# Patient Record
Sex: FEMALE | Race: WHITE | Employment: FULL TIME | URBAN - METROPOLITAN AREA
[De-identification: names, ages, dates, MRNs, and addresses within clinical notes are randomized per-mention and may not be internally consistent; named-entity substitution may affect disease eponyms.]

---

## 2017-03-08 DIAGNOSIS — F17.219 CIGARETTE NICOTINE DEPENDENCE WITH NICOTINE-INDUCED DISORDER: Primary | ICD-10-CM

## 2017-03-08 RX ORDER — NICOTINE 21 MG/24HR
1 PATCH, TRANSDERMAL 24 HOURS TRANSDERMAL EVERY 24 HOURS
Qty: 30 PATCH | Refills: 0 | Status: SHIPPED | OUTPATIENT
Start: 2017-03-08 | End: 2021-06-10

## 2017-03-08 RX ORDER — NICOTINE 21 MG/24HR
1 PATCH, TRANSDERMAL 24 HOURS TRANSDERMAL EVERY 24 HOURS
Qty: 30 PATCH | Refills: 0 | Status: SHIPPED | OUTPATIENT
Start: 2017-03-08

## 2017-03-08 NOTE — TELEPHONE ENCOUNTER
Pending Prescriptions:                       Disp   Refills    nicotine (NICODERM CQ) 21 MG/24HR 24 hr p*28 pat*3            Sig: Place 1 patch onto the skin every 24 hours          Last Written Prescription Date:    Last Fill Quantity: ,   # refills:   Last Office Visit with [facility], [facility] or [facility] prescribing provider: 12/01/16  Future Office visit:       Routing refill request to provider for review/approval because:  Drug not active on patient's medication list

## 2017-04-24 ENCOUNTER — TRANSFERRED RECORDS (OUTPATIENT)
Dept: HEALTH INFORMATION MANAGEMENT | Facility: CLINIC | Age: 67
End: 2017-04-24

## 2017-05-26 ENCOUNTER — HEALTH MAINTENANCE LETTER (OUTPATIENT)
Age: 67
End: 2017-05-26

## 2017-07-06 ENCOUNTER — TELEPHONE (OUTPATIENT)
Dept: FAMILY MEDICINE | Facility: CLINIC | Age: 67
End: 2017-07-06

## 2017-07-06 DIAGNOSIS — E78.5 HYPERLIPIDEMIA LDL GOAL <100: ICD-10-CM

## 2017-07-06 DIAGNOSIS — I10 ESSENTIAL HYPERTENSION, BENIGN: Primary | ICD-10-CM

## 2017-07-06 NOTE — TELEPHONE ENCOUNTER
Reason for Call:  Medication or medication refill:    Do you use a [facility]?  Name of the pharmacy and phone number for the current request:    [id number] - [place], [place] - [address]      Name of the medication requested: The patient called the pharmacy expecting these refiils to be there   [facility] will fax the order  metoprolol-hydrochlorothiazide (DUTOPROL) 25-12.5 MG TB24 per tablet  LOVASTATIN PO    Other request: Pharmacy will fax request   Last filled by Dr. Cornejo    Can we leave a detailed message on this number? No    Phone number patient can be reached at: [phone] [facility]    Best Time: anytime    Call taken on 7/6/2017 at 6:54 PM by Sudha Pandya

## 2017-07-07 RX ORDER — LOVASTATIN 40 MG
80 TABLET ORAL AT BEDTIME
Qty: 180 TABLET | Refills: 1 | Status: SHIPPED | OUTPATIENT
Start: 2017-07-07 | End: 2021-06-10

## 2017-07-07 NOTE — TELEPHONE ENCOUNTER
Pending Prescriptions:                       Disp   Refills    lovastatin (MEVACOR) 40 MG tablet         180 ta*1            Sig: Take 2 tablets (80 mg) by mouth At Bedtime    metoprolol-hydrochlorothiazide (DUTOPROL)*90 tab*1            Sig: Take 1 tablet by mouth daily        Lovastatin      Last Written Prescription Date:  Historical  Last Fill Quantity: -,   # refills: -  Last Office Visit with [facility] or [facility] prescribing provider: 12/1/16 Gena  Future Office visit:       Metoprolol      Last Written Prescription Date:  Historical  Last Fill Quantity: -,   # refills: -  Last Office Visit with [facility] or [facility] prescribing provider: 12/1/16 Gena                                               Routing refill request to provider for review/approval because:  Medication is reported/historical

## 2017-07-24 ENCOUNTER — TELEPHONE (OUTPATIENT)
Dept: FAMILY MEDICINE | Facility: CLINIC | Age: 67
End: 2017-07-24

## 2017-07-24 DIAGNOSIS — I10 ESSENTIAL HYPERTENSION, BENIGN: Primary | ICD-10-CM

## 2017-07-24 NOTE — TELEPHONE ENCOUNTER
I called patient and spoke with her.   She explained that she use to be on just Metoprolol. She states that the recent script for Metoprolol-HCTZ that was sent in 7/7/17 was very expensive and NOT what she was taking before through [facility] (Dr. oCrnejo).   Patient requesting new prescription to be sent into pharmacy    PCP: I called [facility] and they verified that prior to recent prescription, the patient was getting Metoprolol (succinate ER) 25 mg tablet: 1 tablet daily    Please see pended medication and ensure that it is correct and sign if appropriate.     Route back as patient would like a call back regarding this.     Beatriz Sutherland RN

## 2017-07-25 RX ORDER — METOPROLOL SUCCINATE 25 MG/1
25 TABLET, EXTENDED RELEASE ORAL DAILY
Qty: 90 TABLET | Refills: 0 | Status: SHIPPED | OUTPATIENT
Start: 2017-07-25

## 2017-07-25 NOTE — TELEPHONE ENCOUNTER
I only ordered what was noted on her medication list (which was the metoprolol/hydrochlorothiazide) and did not change the medication.  Metoprolol refill approved.  Schedule clinic visit to follow-up on her hypertension.

## 2017-07-25 NOTE — TELEPHONE ENCOUNTER
I called patient and spoke with her. Explained that the Metoprolol 25 mg ER was sent into pharmacy    Told patient to let us know if she has any issues with getting medication at pharmacy    Patient declined scheduling a future appointment with Dr Avery for follow up BP appointment  Patient said that she is getting another call and needs to go.   I asked that patient call clinic back to schedule future appointment.    Beatriz Sutherland RN

## 2017-07-26 ENCOUNTER — TELEPHONE (OUTPATIENT)
Dept: FAMILY MEDICINE | Facility: CLINIC | Age: 67
End: 2017-07-26

## 2017-07-26 NOTE — TELEPHONE ENCOUNTER
Routing to Dr Avery.     Spoke with patient.   Patient states she needs order for Echocardiogram from PCP--to be done at [facility]---for job with [employer].     Fax number listed below.   Patient requesting call back when complete.      Patient has been seen x 1 by Dr Avery---12-1-16.     Thank you,  Rhona BARRIGA RN,BSN

## 2017-07-26 NOTE — TELEPHONE ENCOUNTER
Left in detailed voice message of information below per MD. Told patient to please contact us or to have DOT examiner reach out to .    Korin Whitaker CMA

## 2017-07-26 NOTE — TELEPHONE ENCOUNTER
Please have patient's DOT examiner contact me (or write to me) to discuss the indication for this test.  I cannot order a test without knowing the indication for it.  Stating that it is for DOT is not enough -- I have performed DOT exams and have not routinely requested for an Echocardiogram as part of the DOT physical/evaluation.

## 2017-07-26 NOTE — TELEPHONE ENCOUNTER
Reason for Call: Request for an order or referral:    Order or referral being requested: For a Cardiac Stress Test at [facility]  Fax # [phone]    Date needed: as soon as possible    Has the patient been seen by the PCP for this problem? Not Applicable    Additional comments: The patient needs an order for a Stress test for her Job with the [employer]    Phone number Patient can be reached at:  Home number on file [phone] (home)    Best Time:  Anytime  Please call when Order has been faxed so patient can call and schedule    Can we leave a detailed message on this number?  YES    Call taken on 7/26/2017 at 2:00 PM by Sudha [place]

## 2017-07-28 NOTE — TELEPHONE ENCOUNTER
Left voice message for patient to call clinic back to confirm and relay message below.  Korin Dow- [facility]

## 2017-07-31 NOTE — TELEPHONE ENCOUNTER
Echo required q2 years as patient has stents.     Dr. Avery,   Patient states brought paperwork stating this is required for this patient. Did you find paperwork on your desk? Patient states brought paperwork last Friday for you.

## 2017-08-01 NOTE — TELEPHONE ENCOUNTER
I saw the papers last week and did not encounter anything about the need for echocardiogram because of history of coronary artery stents. Please have her DOT examiner contact me to further discuss this.

## 2017-08-02 NOTE — TELEPHONE ENCOUNTER
Reason for Call:  Returning Call     Detailed comments: Anneliese Oshea is returning phone call     Phone Number Patient can be reached at: Home number on file [phone] (home)    Best Time: ASAP     Can we leave a detailed message on this number? YES    Call taken on 8/2/2017 at 12:31 PM by Kizzy Silva

## 2017-08-02 NOTE — TELEPHONE ENCOUNTER
Tried calling patient back, no answer, and no voicemail picked-up on the phone call. Can try back at later time.  Korin Whitaker CMA

## 2017-08-02 NOTE — TELEPHONE ENCOUNTER
Tried to reach patient at home # given. Phone rang but no V.mail. No other # listed in chart.  Bridget Olea MA

## 2021-06-10 ENCOUNTER — APPOINTMENT (OUTPATIENT)
Dept: GENERAL RADIOLOGY | Facility: CLINIC | Age: 71
DRG: 189 | End: 2021-06-10
Attending: NURSE PRACTITIONER
Payer: MEDICARE

## 2021-06-10 ENCOUNTER — APPOINTMENT (OUTPATIENT)
Dept: GENERAL RADIOLOGY | Facility: CLINIC | Age: 71
DRG: 189 | End: 2021-06-10
Attending: EMERGENCY MEDICINE
Payer: MEDICARE

## 2021-06-10 ENCOUNTER — APPOINTMENT (OUTPATIENT)
Dept: CT IMAGING | Facility: CLINIC | Age: 71
DRG: 189 | End: 2021-06-10
Attending: EMERGENCY MEDICINE
Payer: MEDICARE

## 2021-06-10 ENCOUNTER — HOSPITAL ENCOUNTER (INPATIENT)
Facility: CLINIC | Age: 71
LOS: 3 days | Discharge: HOME | DRG: 189 | End: 2021-06-13
Attending: EMERGENCY MEDICINE | Admitting: INTERNAL MEDICINE
Payer: MEDICARE

## 2021-06-10 DIAGNOSIS — J96.22 ACUTE ON CHRONIC RESPIRATORY FAILURE WITH HYPOXIA AND HYPERCAPNIA (H): ICD-10-CM

## 2021-06-10 DIAGNOSIS — J44.1 COPD EXACERBATION (H): ICD-10-CM

## 2021-06-10 DIAGNOSIS — J96.21 ACUTE ON CHRONIC RESPIRATORY FAILURE WITH HYPOXIA AND HYPERCAPNIA (H): ICD-10-CM

## 2021-06-10 LAB
ALBUMIN SERPL-MCNC: 3.8 G/DL (ref 3.4–5)
ALP SERPL-CCNC: 92 U/L (ref 40–150)
ALT SERPL W P-5'-P-CCNC: 19 U/L (ref 0–50)
ANION GAP SERPL CALCULATED.3IONS-SCNC: 7 MMOL/L (ref 3–14)
AST SERPL W P-5'-P-CCNC: 12 U/L (ref 0–45)
BASE DEFICIT BLDV-SCNC: 0.6 MMOL/L
BASE DEFICIT BLDV-SCNC: 2.1 MMOL/L
BASE EXCESS BLDV CALC-SCNC: 1.3 MMOL/L
BASOPHILS # BLD AUTO: 0.1 10E9/L (ref 0–0.2)
BASOPHILS NFR BLD AUTO: 1 %
BILIRUB SERPL-MCNC: 0.4 MG/DL (ref 0.2–1.3)
BUN SERPL-MCNC: 18 MG/DL (ref 7–30)
CALCIUM SERPL-MCNC: 9.1 MG/DL (ref 8.5–10.1)
CHLORIDE SERPL-SCNC: 105 MMOL/L (ref 94–109)
CO2 SERPL-SCNC: 24 MMOL/L (ref 20–32)
CREAT SERPL-MCNC: 0.86 MG/DL (ref 0.52–1.04)
D DIMER PPP FEU-MCNC: 0.9 UG/ML FEU (ref 0–0.5)
DIFFERENTIAL METHOD BLD: ABNORMAL
EOSINOPHIL # BLD AUTO: 1.1 10E9/L (ref 0–0.7)
EOSINOPHIL NFR BLD AUTO: 9 %
ERYTHROCYTE [DISTWIDTH] IN BLOOD BY AUTOMATED COUNT: 12.6 % (ref 10–15)
GFR SERPL CREATININE-BSD FRML MDRD: 69 ML/MIN/{1.73_M2}
GLUCOSE SERPL-MCNC: 169 MG/DL (ref 70–99)
HCO3 BLDV-SCNC: 25 MMOL/L (ref 21–28)
HCO3 BLDV-SCNC: 26 MMOL/L (ref 21–28)
HCO3 BLDV-SCNC: 28 MMOL/L (ref 21–28)
HCT VFR BLD AUTO: 44.1 % (ref 35–47)
HGB BLD-MCNC: 14.4 G/DL (ref 11.7–15.7)
INR PPP: 0.91 (ref 0.86–1.14)
INTERPRETATION ECG - MUSE: NORMAL
LABORATORY COMMENT REPORT: NORMAL
LACTATE BLD-SCNC: 1.6 MMOL/L (ref 0.7–2)
LYMPHOCYTES # BLD AUTO: 6.2 10E9/L (ref 0.8–5.3)
LYMPHOCYTES NFR BLD AUTO: 50 %
MAGNESIUM SERPL-MCNC: 2.2 MG/DL (ref 1.6–2.3)
MCH RBC QN AUTO: 32.2 PG (ref 26.5–33)
MCHC RBC AUTO-ENTMCNC: 32.7 G/DL (ref 31.5–36.5)
MCV RBC AUTO: 99 FL (ref 78–100)
MONOCYTES # BLD AUTO: 0.8 10E9/L (ref 0–1.3)
MONOCYTES NFR BLD AUTO: 6 %
NEUTROPHILS # BLD AUTO: 4.3 10E9/L (ref 1.6–8.3)
NEUTROPHILS NFR BLD AUTO: 34 %
NT-PROBNP SERPL-MCNC: 213 PG/ML (ref 0–900)
O2/TOTAL GAS SETTING VFR VENT: ABNORMAL %
O2/TOTAL GAS SETTING VFR VENT: ABNORMAL %
O2/TOTAL GAS SETTING VFR VENT: NORMAL %
OXYHGB MFR BLDV: 49 %
OXYHGB MFR BLDV: 82 %
PCO2 BLDV: 46 MM HG (ref 40–50)
PCO2 BLDV: 49 MM HG (ref 40–50)
PCO2 BLDV: 57 MM HG (ref 40–50)
PH BLDV: 7.27 PH (ref 7.32–7.43)
PH BLDV: 7.35 PH (ref 7.32–7.43)
PH BLDV: 7.36 PH (ref 7.32–7.43)
PLATELET # BLD AUTO: 298 10E9/L (ref 150–450)
PLATELET # BLD EST: ABNORMAL 10*3/UL
PO2 BLDV: 28 MM HG (ref 25–47)
PO2 BLDV: 50 MM HG (ref 25–47)
PO2 BLDV: 74 MM HG (ref 25–47)
POTASSIUM SERPL-SCNC: 3.7 MMOL/L (ref 3.4–5.3)
PROT SERPL-MCNC: 7.5 G/DL (ref 6.8–8.8)
RBC # BLD AUTO: 4.47 10E12/L (ref 3.8–5.2)
RBC MORPH BLD: NORMAL
SARS-COV-2 RNA RESP QL NAA+PROBE: NEGATIVE
SODIUM SERPL-SCNC: 136 MMOL/L (ref 133–144)
SPECIMEN SOURCE: NORMAL
TROPONIN I SERPL-MCNC: <0.015 UG/L (ref 0–0.04)
WBC # BLD AUTO: 12.5 10E9/L (ref 4–11)

## 2021-06-10 PROCEDURE — 250N000009 HC RX 250: Performed by: EMERGENCY MEDICINE

## 2021-06-10 PROCEDURE — 250N000011 HC RX IP 250 OP 636: Performed by: INTERNAL MEDICINE

## 2021-06-10 PROCEDURE — 82805 BLOOD GASES W/O2 SATURATION: CPT | Performed by: NURSE PRACTITIONER

## 2021-06-10 PROCEDURE — 83880 ASSAY OF NATRIURETIC PEPTIDE: CPT | Performed by: EMERGENCY MEDICINE

## 2021-06-10 PROCEDURE — 36415 COLL VENOUS BLD VENIPUNCTURE: CPT | Performed by: NURSE PRACTITIONER

## 2021-06-10 PROCEDURE — 94640 AIRWAY INHALATION TREATMENT: CPT

## 2021-06-10 PROCEDURE — 258N000003 HC RX IP 258 OP 636: Performed by: EMERGENCY MEDICINE

## 2021-06-10 PROCEDURE — 84484 ASSAY OF TROPONIN QUANT: CPT | Performed by: EMERGENCY MEDICINE

## 2021-06-10 PROCEDURE — 85379 FIBRIN DEGRADATION QUANT: CPT | Performed by: EMERGENCY MEDICINE

## 2021-06-10 PROCEDURE — 250N000009 HC RX 250: Performed by: INTERNAL MEDICINE

## 2021-06-10 PROCEDURE — 250N000011 HC RX IP 250 OP 636

## 2021-06-10 PROCEDURE — 83735 ASSAY OF MAGNESIUM: CPT | Performed by: EMERGENCY MEDICINE

## 2021-06-10 PROCEDURE — 250N000009 HC RX 250

## 2021-06-10 PROCEDURE — 93005 ELECTROCARDIOGRAM TRACING: CPT

## 2021-06-10 PROCEDURE — 71045 X-RAY EXAM CHEST 1 VIEW: CPT

## 2021-06-10 PROCEDURE — 71275 CT ANGIOGRAPHY CHEST: CPT

## 2021-06-10 PROCEDURE — 120N000001 HC R&B MED SURG/OB

## 2021-06-10 PROCEDURE — 99223 1ST HOSP IP/OBS HIGH 75: CPT | Mod: AI | Performed by: INTERNAL MEDICINE

## 2021-06-10 PROCEDURE — 250N000013 HC RX MED GY IP 250 OP 250 PS 637: Performed by: INTERNAL MEDICINE

## 2021-06-10 PROCEDURE — 80053 COMPREHEN METABOLIC PANEL: CPT | Performed by: EMERGENCY MEDICINE

## 2021-06-10 PROCEDURE — 258N000003 HC RX IP 258 OP 636: Performed by: INTERNAL MEDICINE

## 2021-06-10 PROCEDURE — 85610 PROTHROMBIN TIME: CPT | Performed by: EMERGENCY MEDICINE

## 2021-06-10 PROCEDURE — 94660 CPAP INITIATION&MGMT: CPT

## 2021-06-10 PROCEDURE — 94640 AIRWAY INHALATION TREATMENT: CPT | Mod: 76

## 2021-06-10 PROCEDURE — 87635 SARS-COV-2 COVID-19 AMP PRB: CPT | Performed by: EMERGENCY MEDICINE

## 2021-06-10 PROCEDURE — 96365 THER/PROPH/DIAG IV INF INIT: CPT | Mod: 59

## 2021-06-10 PROCEDURE — 99291 CRITICAL CARE FIRST HOUR: CPT | Performed by: NURSE PRACTITIONER

## 2021-06-10 PROCEDURE — 250N000011 HC RX IP 250 OP 636: Performed by: EMERGENCY MEDICINE

## 2021-06-10 PROCEDURE — 82805 BLOOD GASES W/O2 SATURATION: CPT | Performed by: EMERGENCY MEDICINE

## 2021-06-10 PROCEDURE — 999N000157 HC STATISTIC RCP TIME EA 10 MIN

## 2021-06-10 PROCEDURE — 85025 COMPLETE CBC W/AUTO DIFF WBC: CPT | Performed by: EMERGENCY MEDICINE

## 2021-06-10 PROCEDURE — 99285 EMERGENCY DEPT VISIT HI MDM: CPT | Mod: 25

## 2021-06-10 PROCEDURE — 99207 PR APP CREDIT; MD BILLING SHARED VISIT: CPT | Performed by: INTERNAL MEDICINE

## 2021-06-10 PROCEDURE — 71045 X-RAY EXAM CHEST 1 VIEW: CPT | Mod: 77

## 2021-06-10 PROCEDURE — C9803 HOPD COVID-19 SPEC COLLECT: HCPCS

## 2021-06-10 PROCEDURE — 5A09357 ASSISTANCE WITH RESPIRATORY VENTILATION, LESS THAN 24 CONSECUTIVE HOURS, CONTINUOUS POSITIVE AIRWAY PRESSURE: ICD-10-PCS | Performed by: INTERNAL MEDICINE

## 2021-06-10 PROCEDURE — 83605 ASSAY OF LACTIC ACID: CPT | Performed by: EMERGENCY MEDICINE

## 2021-06-10 PROCEDURE — 96375 TX/PRO/DX INJ NEW DRUG ADDON: CPT

## 2021-06-10 PROCEDURE — 82803 BLOOD GASES ANY COMBINATION: CPT | Performed by: EMERGENCY MEDICINE

## 2021-06-10 RX ORDER — NALOXONE HYDROCHLORIDE 0.4 MG/ML
0.2 INJECTION, SOLUTION INTRAMUSCULAR; INTRAVENOUS; SUBCUTANEOUS
Status: DISCONTINUED | OUTPATIENT
Start: 2021-06-10 | End: 2021-06-13 | Stop reason: HOSPADM

## 2021-06-10 RX ORDER — LORAZEPAM 2 MG/ML
0.5 INJECTION INTRAMUSCULAR ONCE
Status: COMPLETED | OUTPATIENT
Start: 2021-06-10 | End: 2021-06-10

## 2021-06-10 RX ORDER — ALBUTEROL SULFATE 5 MG/ML
10 SOLUTION, NON-ORAL INHALATION CONTINUOUS
Status: ACTIVE | OUTPATIENT
Start: 2021-06-10 | End: 2021-06-10

## 2021-06-10 RX ORDER — IOPAMIDOL 755 MG/ML
57 INJECTION, SOLUTION INTRAVASCULAR ONCE
Status: COMPLETED | OUTPATIENT
Start: 2021-06-10 | End: 2021-06-10

## 2021-06-10 RX ORDER — ALBUTEROL SULFATE 0.83 MG/ML
3 SOLUTION RESPIRATORY (INHALATION)
Status: DISCONTINUED | OUTPATIENT
Start: 2021-06-10 | End: 2021-06-13 | Stop reason: HOSPADM

## 2021-06-10 RX ORDER — IPRATROPIUM BROMIDE AND ALBUTEROL SULFATE 2.5; .5 MG/3ML; MG/3ML
3 SOLUTION RESPIRATORY (INHALATION)
Status: DISCONTINUED | OUTPATIENT
Start: 2021-06-10 | End: 2021-06-13 | Stop reason: HOSPADM

## 2021-06-10 RX ORDER — TRAMADOL HYDROCHLORIDE 50 MG/1
25 TABLET ORAL EVERY 6 HOURS PRN
COMMUNITY

## 2021-06-10 RX ORDER — NALOXONE HYDROCHLORIDE 0.4 MG/ML
0.4 INJECTION, SOLUTION INTRAMUSCULAR; INTRAVENOUS; SUBCUTANEOUS
Status: DISCONTINUED | OUTPATIENT
Start: 2021-06-10 | End: 2021-06-13 | Stop reason: HOSPADM

## 2021-06-10 RX ORDER — SODIUM CHLORIDE, SODIUM LACTATE, POTASSIUM CHLORIDE, CALCIUM CHLORIDE 600; 310; 30; 20 MG/100ML; MG/100ML; MG/100ML; MG/100ML
INJECTION, SOLUTION INTRAVENOUS CONTINUOUS
Status: DISCONTINUED | OUTPATIENT
Start: 2021-06-10 | End: 2021-06-12

## 2021-06-10 RX ORDER — CEFTRIAXONE 2 G/1
2 INJECTION, POWDER, FOR SOLUTION INTRAMUSCULAR; INTRAVENOUS ONCE
Status: COMPLETED | OUTPATIENT
Start: 2021-06-10 | End: 2021-06-10

## 2021-06-10 RX ORDER — METHYLPREDNISOLONE SODIUM SUCCINATE 125 MG/2ML
125 INJECTION, POWDER, LYOPHILIZED, FOR SOLUTION INTRAMUSCULAR; INTRAVENOUS ONCE
Status: COMPLETED | OUTPATIENT
Start: 2021-06-10 | End: 2021-06-10

## 2021-06-10 RX ORDER — MAGNESIUM SULFATE HEPTAHYDRATE 40 MG/ML
2 INJECTION, SOLUTION INTRAVENOUS ONCE
Status: COMPLETED | OUTPATIENT
Start: 2021-06-10 | End: 2021-06-10

## 2021-06-10 RX ORDER — AMOXICILLIN 250 MG
1 CAPSULE ORAL 2 TIMES DAILY PRN
Status: DISCONTINUED | OUTPATIENT
Start: 2021-06-10 | End: 2021-06-13 | Stop reason: HOSPADM

## 2021-06-10 RX ORDER — CARBOXYMETHYLCELLULOSE SODIUM 5 MG/ML
1 SOLUTION/ DROPS OPHTHALMIC
Status: DISCONTINUED | OUTPATIENT
Start: 2021-06-10 | End: 2021-06-13 | Stop reason: HOSPADM

## 2021-06-10 RX ORDER — AMOXICILLIN 250 MG
2 CAPSULE ORAL 2 TIMES DAILY PRN
Status: DISCONTINUED | OUTPATIENT
Start: 2021-06-10 | End: 2021-06-13 | Stop reason: HOSPADM

## 2021-06-10 RX ORDER — AZITHROMYCIN 500 MG/1
500 INJECTION, POWDER, LYOPHILIZED, FOR SOLUTION INTRAVENOUS ONCE
Status: COMPLETED | OUTPATIENT
Start: 2021-06-10 | End: 2021-06-10

## 2021-06-10 RX ORDER — LORAZEPAM 2 MG/ML
INJECTION INTRAMUSCULAR
Status: COMPLETED
Start: 2021-06-10 | End: 2021-06-10

## 2021-06-10 RX ORDER — GUAIFENESIN/DEXTROMETHORPHAN 100-10MG/5
10 SYRUP ORAL EVERY 4 HOURS PRN
Status: DISCONTINUED | OUTPATIENT
Start: 2021-06-10 | End: 2021-06-13 | Stop reason: HOSPADM

## 2021-06-10 RX ORDER — ACETAMINOPHEN 650 MG/1
650 SUPPOSITORY RECTAL EVERY 4 HOURS PRN
Status: DISCONTINUED | OUTPATIENT
Start: 2021-06-10 | End: 2021-06-13 | Stop reason: HOSPADM

## 2021-06-10 RX ORDER — ONDANSETRON 2 MG/ML
4 INJECTION INTRAMUSCULAR; INTRAVENOUS EVERY 6 HOURS PRN
Status: DISCONTINUED | OUTPATIENT
Start: 2021-06-10 | End: 2021-06-13 | Stop reason: HOSPADM

## 2021-06-10 RX ORDER — ONDANSETRON 4 MG/1
4 TABLET, ORALLY DISINTEGRATING ORAL EVERY 6 HOURS PRN
Status: DISCONTINUED | OUTPATIENT
Start: 2021-06-10 | End: 2021-06-13 | Stop reason: HOSPADM

## 2021-06-10 RX ORDER — ACETAMINOPHEN 325 MG/1
650 TABLET ORAL EVERY 4 HOURS PRN
Status: DISCONTINUED | OUTPATIENT
Start: 2021-06-10 | End: 2021-06-13 | Stop reason: HOSPADM

## 2021-06-10 RX ORDER — IPRATROPIUM BROMIDE AND ALBUTEROL SULFATE 2.5; .5 MG/3ML; MG/3ML
3 SOLUTION RESPIRATORY (INHALATION)
Status: COMPLETED | OUTPATIENT
Start: 2021-06-10 | End: 2021-06-10

## 2021-06-10 RX ORDER — LIDOCAINE 40 MG/G
CREAM TOPICAL
Status: DISCONTINUED | OUTPATIENT
Start: 2021-06-10 | End: 2021-06-13 | Stop reason: HOSPADM

## 2021-06-10 RX ORDER — CYCLOBENZAPRINE HCL 5 MG
5 TABLET ORAL 3 TIMES DAILY PRN
COMMUNITY

## 2021-06-10 RX ORDER — METHYLPREDNISOLONE SODIUM SUCCINATE 125 MG/2ML
60 INJECTION, POWDER, LYOPHILIZED, FOR SOLUTION INTRAMUSCULAR; INTRAVENOUS EVERY 12 HOURS
Status: DISCONTINUED | OUTPATIENT
Start: 2021-06-10 | End: 2021-06-10

## 2021-06-10 RX ORDER — IPRATROPIUM BROMIDE AND ALBUTEROL SULFATE 2.5; .5 MG/3ML; MG/3ML
3 SOLUTION RESPIRATORY (INHALATION) ONCE
Status: COMPLETED | OUTPATIENT
Start: 2021-06-10 | End: 2021-06-10

## 2021-06-10 RX ORDER — METHYLPREDNISOLONE SODIUM SUCCINATE 125 MG/2ML
60 INJECTION, POWDER, LYOPHILIZED, FOR SOLUTION INTRAMUSCULAR; INTRAVENOUS EVERY 8 HOURS
Status: DISCONTINUED | OUTPATIENT
Start: 2021-06-10 | End: 2021-06-12

## 2021-06-10 RX ORDER — BISACODYL 10 MG
10 SUPPOSITORY, RECTAL RECTAL DAILY PRN
Status: DISCONTINUED | OUTPATIENT
Start: 2021-06-10 | End: 2021-06-13 | Stop reason: HOSPADM

## 2021-06-10 RX ORDER — PROCHLORPERAZINE MALEATE 5 MG
5 TABLET ORAL EVERY 6 HOURS PRN
Status: DISCONTINUED | OUTPATIENT
Start: 2021-06-10 | End: 2021-06-13 | Stop reason: HOSPADM

## 2021-06-10 RX ORDER — PANTOPRAZOLE SODIUM 40 MG/1
40 TABLET, DELAYED RELEASE ORAL
Status: DISCONTINUED | OUTPATIENT
Start: 2021-06-10 | End: 2021-06-13 | Stop reason: HOSPADM

## 2021-06-10 RX ORDER — PROCHLORPERAZINE 25 MG
12.5 SUPPOSITORY, RECTAL RECTAL EVERY 12 HOURS PRN
Status: DISCONTINUED | OUTPATIENT
Start: 2021-06-10 | End: 2021-06-13 | Stop reason: HOSPADM

## 2021-06-10 RX ORDER — IPRATROPIUM BROMIDE AND ALBUTEROL SULFATE 2.5; .5 MG/3ML; MG/3ML
SOLUTION RESPIRATORY (INHALATION)
Status: COMPLETED
Start: 2021-06-10 | End: 2021-06-10

## 2021-06-10 RX ORDER — NITROGLYCERIN 0.4 MG/1
0.4 TABLET SUBLINGUAL EVERY 5 MIN PRN
Status: DISCONTINUED | OUTPATIENT
Start: 2021-06-10 | End: 2021-06-13 | Stop reason: HOSPADM

## 2021-06-10 RX ORDER — TRAMADOL HYDROCHLORIDE 50 MG/1
50 TABLET ORAL EVERY 6 HOURS PRN
Status: DISCONTINUED | OUTPATIENT
Start: 2021-06-10 | End: 2021-06-13 | Stop reason: HOSPADM

## 2021-06-10 RX ORDER — LIDOCAINE 40 MG/G
CREAM TOPICAL
Status: DISCONTINUED | OUTPATIENT
Start: 2021-06-10 | End: 2021-06-13

## 2021-06-10 RX ORDER — CYCLOBENZAPRINE HCL 5 MG
5 TABLET ORAL 3 TIMES DAILY PRN
Status: DISCONTINUED | OUTPATIENT
Start: 2021-06-10 | End: 2021-06-13 | Stop reason: HOSPADM

## 2021-06-10 RX ORDER — AZITHROMYCIN 250 MG/1
250 TABLET, FILM COATED ORAL DAILY
Status: DISCONTINUED | OUTPATIENT
Start: 2021-06-11 | End: 2021-06-13 | Stop reason: HOSPADM

## 2021-06-10 RX ADMIN — ACETAMINOPHEN 650 MG: 325 TABLET, FILM COATED ORAL at 22:05

## 2021-06-10 RX ADMIN — GUAIFENESIN AND DEXTROMETHORPHAN 10 ML: 100; 10 SYRUP ORAL at 11:09

## 2021-06-10 RX ADMIN — SODIUM CHLORIDE, POTASSIUM CHLORIDE, SODIUM LACTATE AND CALCIUM CHLORIDE: 600; 310; 30; 20 INJECTION, SOLUTION INTRAVENOUS at 20:04

## 2021-06-10 RX ADMIN — IPRATROPIUM BROMIDE AND ALBUTEROL SULFATE 3 ML: .5; 3 SOLUTION RESPIRATORY (INHALATION) at 03:11

## 2021-06-10 RX ADMIN — AZITHROMYCIN MONOHYDRATE 500 MG: 500 INJECTION, POWDER, LYOPHILIZED, FOR SOLUTION INTRAVENOUS at 06:36

## 2021-06-10 RX ADMIN — LORAZEPAM 2 MG: 2 INJECTION INTRAMUSCULAR; INTRAVENOUS at 02:49

## 2021-06-10 RX ADMIN — CYCLOBENZAPRINE HYDROCHLORIDE 5 MG: 5 TABLET, FILM COATED ORAL at 22:54

## 2021-06-10 RX ADMIN — TRAMADOL HYDROCHLORIDE 50 MG: 50 TABLET, FILM COATED ORAL at 17:28

## 2021-06-10 RX ADMIN — IPRATROPIUM BROMIDE AND ALBUTEROL SULFATE: .5; 3 SOLUTION RESPIRATORY (INHALATION) at 02:50

## 2021-06-10 RX ADMIN — IPRATROPIUM BROMIDE AND ALBUTEROL SULFATE 3 ML: .5; 3 SOLUTION RESPIRATORY (INHALATION) at 03:12

## 2021-06-10 RX ADMIN — CEFTRIAXONE SODIUM 2 G: 2 INJECTION, POWDER, FOR SOLUTION INTRAMUSCULAR; INTRAVENOUS at 06:00

## 2021-06-10 RX ADMIN — SODIUM CHLORIDE, POTASSIUM CHLORIDE, SODIUM LACTATE AND CALCIUM CHLORIDE 500 ML: 600; 310; 30; 20 INJECTION, SOLUTION INTRAVENOUS at 02:57

## 2021-06-10 RX ADMIN — METHYLPREDNISOLONE SODIUM SUCCINATE 62.5 MG: 125 INJECTION, POWDER, FOR SOLUTION INTRAMUSCULAR; INTRAVENOUS at 11:09

## 2021-06-10 RX ADMIN — LORAZEPAM 2 MG: 2 INJECTION INTRAMUSCULAR at 02:49

## 2021-06-10 RX ADMIN — IPRATROPIUM BROMIDE AND ALBUTEROL SULFATE 3 ML: .5; 3 SOLUTION RESPIRATORY (INHALATION) at 21:52

## 2021-06-10 RX ADMIN — ACETAMINOPHEN 650 MG: 325 TABLET, FILM COATED ORAL at 10:23

## 2021-06-10 RX ADMIN — METHYLPREDNISOLONE SODIUM SUCCINATE 62.5 MG: 125 INJECTION, POWDER, FOR SOLUTION INTRAMUSCULAR; INTRAVENOUS at 20:04

## 2021-06-10 RX ADMIN — IOPAMIDOL 57 ML: 755 INJECTION, SOLUTION INTRAVENOUS at 05:03

## 2021-06-10 RX ADMIN — IPRATROPIUM BROMIDE AND ALBUTEROL SULFATE 3 ML: .5; 3 SOLUTION RESPIRATORY (INHALATION) at 09:12

## 2021-06-10 RX ADMIN — SODIUM CHLORIDE, POTASSIUM CHLORIDE, SODIUM LACTATE AND CALCIUM CHLORIDE: 600; 310; 30; 20 INJECTION, SOLUTION INTRAVENOUS at 08:00

## 2021-06-10 RX ADMIN — IPRATROPIUM BROMIDE AND ALBUTEROL SULFATE 3 ML: .5; 3 SOLUTION RESPIRATORY (INHALATION) at 15:36

## 2021-06-10 RX ADMIN — MAGNESIUM SULFATE HEPTAHYDRATE 2 G: 40 INJECTION, SOLUTION INTRAVENOUS at 02:50

## 2021-06-10 RX ADMIN — METHYLPREDNISOLONE SODIUM SUCCINATE 125 MG: 125 INJECTION, POWDER, FOR SOLUTION INTRAMUSCULAR; INTRAVENOUS at 02:49

## 2021-06-10 RX ADMIN — SODIUM CHLORIDE, POTASSIUM CHLORIDE, SODIUM LACTATE AND CALCIUM CHLORIDE: 600; 310; 30; 20 INJECTION, SOLUTION INTRAVENOUS at 03:35

## 2021-06-10 ASSESSMENT — ACTIVITIES OF DAILY LIVING (ADL)
ADLS_ACUITY_SCORE: 19

## 2021-06-10 NOTE — ED PROVIDER NOTES
History     Chief Complaint:  Shortness of Breath    The history is provided by the patient and the EMS personnel. The history is limited by the condition of the patient.      Anneliese Oshea is a 71 year old female with a history of COPD, CAD, hyperlipidemia, and hypertension who presents with shortness of breath. Per EMS report, the patient started to feel short of breath yesterday that has been progressively worsening, prompting her 911 call. When they arrived, she was diaphoretic and not experiencing very much air movement which made them place her on CPAP and her O2 returned to 99%. The patient had a blood pressure of 180 systolic. Here, the patient communicated she has had an increasing dry cough with her shortness of breath but denies fevers, leg swelling or any other swelling, or chest pain.     Review of Systems   Unable to perform ROS: Severe respiratory distress       Allergies:  Latex    Medications:    Albuterol  Aspirin 81 mg  Lovastatin  Metoprolol Succinate  Nicoderm CQ    Past Medical History:    CAD  Hemorrhoids  Hypertension  Hyperlipidemia  Reactive airway disease  Prediabetes  COPD  Coronary atherosclerosis  GERD  Smoking    Past Surgical History:    Hysterectomy  Carpal tunnel release  Coronary stent placement    Family History:    Father: lung cancer  Brother: bipolar disorder  Mother: post fractured hip  Sister: morbid obesity    Social History:  Presents to ED via EMS alone.     Physical Exam     Patient Vitals for the past 24 hrs:   BP Temp Temp src Pulse Resp SpO2   06/10/21 0600 136/87 -- -- 80 17 99 %   06/10/21 0530 (!) 163/107 -- -- 91 -- --   06/10/21 0445 -- -- -- 77 16 100 %   06/10/21 0430 122/81 -- -- 74 19 100 %   06/10/21 0415 138/86 -- -- 79 22 100 %   06/10/21 0400 (!) 140/94 -- -- 81 28 100 %   06/10/21 0354 -- -- -- 84 25 96 %   06/10/21 0353 -- -- -- 77 21 99 %   06/10/21 0352 -- -- -- 85 10 100 %   06/10/21 0345 131/82 -- -- 75 20 99 %   06/10/21 0330 (!) 132/93 -- -- 79  25 100 %   06/10/21 0320 (!) 163/102 -- -- 85 24 99 %   06/10/21 0305 -- 96.2  F (35.7  C) Temporal -- -- --   06/10/21 0300 (!) 162/110 -- -- 93 (!) 33 100 %   06/10/21 0245 (!) 150/128 -- -- 98 24 100 %       Physical Exam  General: Appears ill, severe respiratory distress, unable to speak more than 1 word at at time.   Head:  Scalp, face, and head appear normal  Eyes:  Pupils are equal, round, reactive to light     Conjunctivae non-injected and sclerae white  ENT:    The external nose is normal    Pinnae are normal  Neck:  Normal range of motion    There is no rigidity noted    Trachea is in the midline  CV:  Regular rate and rhythm     Normal S1/S2, no S3/S4    No murmur or rub. Radial pulses 2+ bilaterally.  Resp:  Poor air movement throughout. Diffuse expiratory wheezing. + tachypnea with significant increased work of breathing and accessory muscle use. Unable to speak in full sentences.  GI:  Abdomen is soft, no rigidity or guarding    No distension, or mass    No tenderness or rebound tenderness   MS:  Normal muscular tone    Symmetric motor strength    No lower extremity edema. No calf swelling or tenderness.  Skin:  No rash or acute skin lesions noted  Neuro: Awake and alert  No facial droop.   Moves all extremities spontaneously  Psych:  Normal affect. Appropriate interactions.      Emergency Department Course   ECG:  ECG taken at 245, ECG read at 258  Normal sinus rhythm with sinus arrhythmia  Nonspecific ST and T wave abnormality  Abnormal ECG   Artifact limits interpretation  Rate 98 bpm. OH interval 176 ms. QRS duration 74 ms. QT/QTc 380/485 ms. P-R-T axes 79 72 112.     Imaging:    CT chest pulmonary embolism w contrast:  IMPRESSION:   1.  There is no pulmonary embolus.   2.  Borderline aneurysmal ascending thoracic aorta measuring 4.0 cm. Evaluation for aortic dissection is limited by suboptimal opacification of the aorta.   3.  Coronary artery atherosclerotic calcifications.       Reading per  radiology.    XR Chest port 1 view:  IMPRESSION: Cardiomediastinal silhouette within normal limits. Calcified granuloma left lung base. No vascular congestion or pleural effusion. Slight interstitial prominence upper lungs. Coronary artery stent. Atherosclerotic aorta.  Reading per radiology.     Laboratory:    Asymptomatic COVID-19 Virus (Coronavirus) by PCR Nasopharyngeal swab: Negative     CBC: WBC 12.5 (H), HGB 14.4,   CMP: glucose: 169 (H) o/w WNL (Creatinine 0.86)     Lactic acid: 1.6     Magnesium: 2.2    D dimer: 0.9 (H)    INR: 0.91    Troponin I: <0.015    Nt probnp impatient: 213    Blood Gas (Collected 0253): pH 7.27 (L), PCO2 57 (H), PO2 74 (H), Bicarbonate 26, base deficit 2.1, FIO2 50%     Emergency Department Course:    0241 Patient arrived via EMS to stabilization room. I received report from EMS and performed exam on patient as above.     0243 CPAP paused.     0248 Med given: Ativan 0.5 mg.    0249 Med given: Solu-medrol.    0350 Patient rechecked and updated.      0524 I spoke with Dr. Crain of the hospitalist service from [facility] regarding patient's presentation, findings, and plan of care.     Interventions:  0249 Ativan 2 mg IV   0249 Solu-medrol 125 mg IV   0250 Magnesium sulfate 2 g IV   0257  mL IV   0311 Duoneb 3 mL neb  0312 Duoneb 3 mL neb  0335 LR IV       Disposition:  The patient was admitted to the hospital under the care of Dr. Crain.    Impression & Plan      Medical Decision Making:  Anneliese Oshea is a 71 year old female who presents for evaluation of shortness of breath and wheezing.  Signs and symptoms are consistent with severe COPD exacerbation.  On arrival patient was on CPAP started by EMS.  She was exhibiting severe increased work of breathing, respiratory distress and hypercapnic respiratory failure therefore she was transitioned to BiPAP here in the emergency department.  A broad differential was considered including COPD, asthma, reactive airway  disease, CHF, pneumothorax, pleural effusions, cardiac equivalent/ACS, viral induced wheezing, allergic phenomena, pneumonia, etc. patient was treated with steroids, continuous DuoNeb's, magnesium sulfate and LR fluid bolus with significant improvement in her respiratory status.  She was covered.  Clear for pneumonia/bacterial bronchitis due to the degree of her respiratory distress with ceftriaxone and azithromycin.  EKG reveals normal sinus rhythm without evidence of ischemia or dysrhythmia.  Troponin negative.  Lactic acid within normal limits.  BNP normal.  Initial VBG with hypercapnic respiratory failure and respiratory acidosis.  This improved on repeat.  The remainder of ED work-up is otherwise reassuring.  No evidence of pneumonia.  D-dimer was elevated therefore CT of the chest was obtained.  This was negative for PE.  No other acute thoracic findings were seen on CT.  COVID-19 testing was negative.    There are no signs at this point of any other serious etiologies including those mentioned above especially acute coronary syndrome. I doubt this is ACS given the classic story of COPD exacerbation given by the patient, the marked wheezing without rales and a nonspecific EKG.  No signs of pneumonia.      Given the degree of her respiratory failure and severe symptoms patient require admission to the hospital for ongoing monitoring evaluation and treatment.  The case was discussed with the hospitalist the patient was admitted to [facility] in improved condition.  She was able to be taken off of BiPAP prior to admission.    Critical Care Time:   Upon my evaluation, this patient had a critical illness with high probability of imminent or life-threatening deterioration due to acute hypoxic and hypercapnic respiratory failure, which required my direct attention, intervention, and personal management.    I have personally provided 50 minutes of critical care time exclusive of time spent on separately billable procedures.  Time includes review of laboratory data, radiology results, discussion with consultants, reassessment of the patient and monitoring for potential decompensation. Interventions were performed as documented above.     Covid-19  Anneliese Oshea was evaluated during a global COVID-19 pandemic, which necessitated consideration that the patient might be at risk for infection with the SARS-CoV-2 virus that causes COVID-19.   Applicable protocols for evaluation were followed during the patient's care.   COVID-19 was considered as part of the patient's evaluation. The plan for testing is:  a test was obtained during this visit.    Diagnosis:    ICD-10-CM    1. COPD exacerbation (H)  J44.1 Blood gas venous and oxyhgb   2. Acute on chronic respiratory failure with hypoxia and hypercapnia (H)  J96.21     J96.22      Scribe Disclosure:  Joleen ENGLE and Orla Severson, am serving as a scribe at 2:43 AM on 6/10/2021 to document services personally performed by Enmanuel Hinojosa MD based on my observations and the provider's statements to me.      Enmanuel Hinojosa MD  06/10/21 0700

## 2021-06-10 NOTE — PLAN OF CARE
IM[facility]: VSS on Bipap @ 70%. Initially using 6-10 L oxymask but d/t coughing spells, pt becomes very SOB and unable to catch breath. RRT called early afternoon. PRN robitussin given for dry cough. Tachycardic while coughing but 80-90 HR while resting. Very dyspneic on exertion, therefore using external catheter to void. UOP adequate. Tele SR/ST. Duonebs scheduled Q4. Skin intact. IVF @ 60. Tylenol given for headache. Tramadol given for chronic shoulder pain. Reg diet/NPO on bipap. CTM

## 2021-06-10 NOTE — PHARMACY-ADMISSION MEDICATION HISTORY
Pharmacy Medication History  Admission medication history interview status for the 6/10/2021  admission is complete. See EPIC admission navigator for prior to admission medications     Location of Interview: Patient room  Medication history sources: Patient    Significant changes made to the medication list:  Removed Step 1 nicotine patch, lovastatin  Added aspirin, tramadol, and cyclobenzaprine    In the past week, patient estimated taking medication this percent of the time: greater than 90%    Additional medication history information:   Pt stopped taking aspirin 1 week ago for upcoming surgery in July (was instructed to hold it 3 weeks prior to surgery).  Pt has a nicotine patch on her arm, last placed 6/9 morning    Medication reconciliation completed by provider prior to medication history? No    Time spent in this activity: 10 minutes    Prior to Admission medications    Medication Sig Last Dose Taking? Auth Provider   albuterol (PROAIR HFA/PROVENTIL HFA/VENTOLIN HFA) 108 (90 BASE) MCG/ACT Inhaler Inhale 2 puffs into the lungs 4 times daily  Yes Reported, Patient   cyclobenzaprine (FLEXERIL) 5 MG tablet Take 5 mg by mouth 3 times daily as needed for muscle spasms  Yes Unknown, Entered By History   metoprolol (TOPROL-XL) 25 MG 24 hr tablet Take 1 tablet (25 mg) by mouth daily 6/9/2021 at AM Yes Avila Avery MD   nicotine (NICODERM CQ) 14 MG/24HR 24 hr patch Place 1 patch onto the skin every 24 hours 2nd month 6/9/2021 at AM Yes Avila Avery MD   traMADol (ULTRAM) 50 MG tablet Take 50 mg by mouth every 6 hours as needed for severe pain  Yes Unknown, Entered By History   nicotine (NICODERM CQ) 7 MG/24HR 24 hr patch Place 1 patch onto the skin every 24 hours 3rd month   Avila Avery MD       The information provided in this note is only as accurate as the sources available at the time of update(s)

## 2021-06-10 NOTE — ED NOTES
Bed: ST03  Expected date:   Expected time:   Means of arrival:   Comments:  531 70f COPD respiratory distress on CPAP eta 8

## 2021-06-10 NOTE — PROGRESS NOTES
RECEIVING UNIT ED HANDOFF REVIEW    ED Nurse Handoff Report was reviewed by: Cata Azul RN on Tg 10, 2021 at 8:28 AM

## 2021-06-10 NOTE — ED NOTES
Patient transported to CT on cart with RN. Patient remains on 10 LPM oxymask. Respirations remain regular and unlabored.

## 2021-06-10 NOTE — H&P
Admitted: 06/10/2021    PRIMARY CARE PHYSICIAN:  Avila Avery M.D.    CODE STATUS:  FULL CODE.  Discussed with the patient.    CHIEF COMPLAINT:  Shortness of breath.    HISTORY OF PRESENT ILLNESS:  Ms. Oshea is a 71-year-old female with a past medical history significant for COPD, coronary artery disease, hypertension, who presents to the Emergency Department with the above concern.  History is obtained through discussion with the ED physician as well as the patient.  The patient states that she has been having shortness of breath, dating back for which she thinks could be at least a couple of weeks, but has been worsening over the past couple of days.  She states that anytime it gets hot or humid or rains outside she starts having difficulty breathing.  The humid and hot weather that we have been having recently has been particularly troublesome for her.  She states that she was having such a hard time catching her breath that she had to call EMS.  She denies any fevers or chills.  She does have a cough at times, which she attributes mostly to postnasal drip.  When she does cough there is usually some clearish type sputum.  She does not get infections regularly, but has been told she has chronic bronchitis.  No sick contacts that she is aware of or no traveling.  When EMS arrived, she was in respiratory distress and required a CPAP, which is how she arrived via EMS.    In the ED, she continued to have ongoing respiratory failure and was placed on BiPAP and received 1 hour of continuous DuoNebs as well as magnesium.  She eventually had improvement in her symptoms and was able to be weaned off of the BiPAP to facemask oxygen.  When she got up to go to the bathroom, she had a slight decompensation and had to turn up oxygen, but has not required BiPAP again at this point.  The patient believes she has been taking all of her medications regularly, but tells me that she typically has such shallow breaths that she could  not tolerate anything like Advair or Spiriva, though it is not clear to me exactly if she has tried these medications in the past.  She does use albuterol at home as needed.    In the ED, the patient also underwent CT of the chest with contrast, which was negative for PE, did show a borderline ascending aortic thoracic aneurysm at 4 cm.  BMP and troponin were unremarkable.  She was given DuoNebs and magnesium as above, started on BiPAP as well as given ceftriaxone, azithromycin, and Solu-Medrol.    PAST MEDICAL HISTORY:    1.  Coronary artery disease.  2.  Hypertension.  3.  Dyslipidemia.  4.  COPD.  5.  Hemorrhoids.  6.  Prediabetes.  7.  GERD.    MEDICATIONS:    Medications Prior to Admission   Medication Sig Dispense Refill Last Dose     albuterol (PROAIR HFA/PROVENTIL HFA/VENTOLIN HFA) 108 (90 BASE) MCG/ACT Inhaler Inhale 2 puffs into the lungs 4 times daily        cyclobenzaprine (FLEXERIL) 5 MG tablet Take 5 mg by mouth 3 times daily as needed for muscle spasms        metoprolol (TOPROL-XL) 25 MG 24 hr tablet Take 1 tablet (25 mg) by mouth daily 90 tablet 0 6/9/2021 at AM     nicotine (NICODERM CQ) 14 MG/24HR 24 hr patch Place 1 patch onto the skin every 24 hours 2nd month 30 patch 0 6/9/2021 at AM     traMADol (ULTRAM) 50 MG tablet Take 25 mg by mouth every 6 hours as needed for severe pain             SOCIAL HISTORY:  The patient quit smoking 4 weeks ago.  Prior to that, smoked 1/2 pack to 1 pack of cigarettes per day.  The patient also quit drinking all alcohol in April.    FAMILY HISTORY:  Both of her parents have passed away and she is unsure of any history for them, but notes her sister has asthma.    ALLERGIES:  NO KNOWN DRUG ALLERGIES.    REVIEW OF SYSTEMS:  Complete review of systems reviewed and negative except for the pertinent positives, which is recorded in HPI.    PHYSICAL EXAMINATION:    VITAL SIGNS:  Show blood pressure of 123/88, heart rate 84, respirations 12, satting 99% on facemask oxygen,  temperature 96.2 degrees Fahrenheit.  GENERAL:  The patient is lying in bed and is mildly short of breath at this point with oxygen and minimal speaking.  HEENT:  Pupils equal, round, reactive to light.  Extraocular muscle function intact.  No scleral icterus.  Oropharynx is clear.  NECK:  No lymphadenopathy or thyromegaly.  CARDIOVASCULAR:  Regular rate and rhythm without any murmur, rub or gallop.  LUNGS:  Air.  PULMONARY:  She has somewhat diminished breath sounds and has wheezing throughout, mostly expiratory.  GASTROINTESTINAL:  Positive bowel sounds, Soft, nontender and nondistended.  SKIN:  No rashes or lesions.  LYMPHATICS:  No peripheral edema.  PSYCHIATRIC:  Alert and oriented x3.  Normal affect.  NEUROLOGIC:  Cranial nerves II through XII are grossly intact.  No new focal deficits.    LABORATORY DATA:  WBC count 12.5, hemoglobin 14.4, platelets of 298.  Sodium 136, potassium 3.7, chloride 105, CO2 of 24, BUN 18, creatinine 0.86.  Unremarkable LFTs.  Lactic acid normal at 1.6.  BNP is 213.  Troponin is undetectable.  A CT of the chest is as discussed above.    IMPRESSION AND PLAN:  Ms. Oshea is a 71-year-old female with a past medical history significant for COPD, coronary artery disease and hypertension, who presents to the Emergency Department with increasing shortness of breath and found to have a COPD exacerbation.  1.  Chronic obstructive pulmonary disease exacerbation with acute hypercapnic respiratory failure:  The patient did have improvement in her pCO2 from 57 down to 46 and improvement in her pH from 7.27 to 7.35.  With initiation of BiPAP.  At this point, I will continue with the face mask oxygen and will have BiPAP available as needed.  We will continue with Solu-Medrol scheduled DuoNebs and p.r.n. albuterol nebs.  There is no evidence of pneumonia on CT of the chest, which she does have a white count and a somewhat productive cough, so we will continue with azithromycin for the possibility of  bronchitis given her underlying COPD.  May benefit from something like Advair or Spiriva once we can get her breathing better, so she can take deeper breaths to allow the medicine to work better.  If she is willing may be worthwhile to try something like this on discharge.  2.  Borderline aneurysmal ascending thoracic aorta:  Noted to be 4.0 cm on a CT of the chest.  We will ask the patient to follow up with PCP for surveillance.  3.  Hypertension:  We will continue with PTA regimen once confirmed, but appears to include metoprolol.  4.  Dyslipidemia:  Appears to be on lovastatin, which can be continued upon discharge.  5.  Deep venous thrombosis prophylaxis:  SCDs.  6.  Disposition:  I anticipate at least 2 nights in the hospital to allow further improvement in her respiratory status before discharge home.    Servando Crain DO        D: 06/10/2021   T: 06/10/2021   MT: [email or web address]    Name:     MIGUEL CAMEJO  MRN:      [id number]        Account:     [id number]   :      1950           Admitted:    06/10/2021       Document: [id number]

## 2021-06-10 NOTE — ED NOTES
Assumed care at this time.    Pt resting comfortably on 6L NC with Sat's at 100% at this time; SR in 80's bpm; and SBP's stable in the 130's.  LR infusing at 125 ML/hr.  CT results pending.    Radha Cage RN,.......................................... 6/10/2021   5:05 AM

## 2021-06-10 NOTE — ED TRIAGE NOTES
Arrives via EMS on Cpap due to dyspnea. Pt has Hx of COPD. Worsening dyspnea all day. Home nebs not working. Duoneb X2 en route.

## 2021-06-10 NOTE — ED NOTES
[facility]  ED Nurse Handoff Report    ED Chief complaint: Shortness of Breath      ED Diagnosis:   Final diagnoses:   None       Code Status: To be addressed by admitting MD    Allergies: No Known Allergies    Patient Story: Anneliese is a 71 y.o. female with a history of CHF and COPD who presents to ED via EMS for increased shortness of breath. Patient has had increased difficulty feeling like she can get a full breath in. Patient saw her PCP and was scheduled to have a CT scan of her chest on Monday.     Focused Assessment:  Patient is ill appearing in marked distress. Respirations are labored and tachypneic. Patient only able to talk in 1 word sentences. Patient is pale and diaphoretic. Patient arrived on CPAP. Patient had 18ga PIV in Right wrist from EMS. RN placed 18ga PIV in left FA. Labs drawn and sent. Patient placed on BiPap 12/6 40% FiO2 and given Duonebs. Patient given Solumedrol, Magnesium IV, and Ativan IVP. Patient weaned off BiPap to oxymask. Respirations are now regular and unlabored. Rate 18-22 breaths per minute. NSR 70s-80s. Patient was HTN on arrival - now normotensive. Afebrile.     Treatments and/or interventions provided: See above  Patient's response to treatments and/or interventions: improved    To be done/followed up on inpatient unit:  N/A    Does this patient have any cognitive concerns?: N/A    Activity level - Baseline/Home:  Independent  Activity Level - Current:   [facility]    Patient's Preferred language: English   Needed?: No    Isolation: None  Infection: Not Applicable  Patient tested for COVID 19 prior to admission: YES  Bariatric?: No    Vital Signs:   Vitals:    06/10/21 0354 06/10/21 0400 06/10/21 0415 06/10/21 0430   BP:  (!) 140/94 138/86 122/81   Pulse: 84 81 79 74   Resp: 25 28 22 19   Temp:       TempSrc:       SpO2: 96% 100% 100% 100%       Cardiac Rhythm:     Was the PSS-3 completed:   Yes  What interventions are required if any?                Family Comments: None Present  OBS brochure/video discussed/provided to patient/family: No              Name of person given brochure if not patient: N/A              Relationship to patient: N/A    For the majority of the shift this patient's behavior was Green.   Behavioral interventions performed were N/A.    ED NURSE PHONE NUMBER: [phone]

## 2021-06-10 NOTE — ED NOTES
Report received. Care of patient assumed. Patient lying quietly on cart with eyes closed. Respirations are regular and assisted with BiPap (12/6). Patient on ECG NIBP and SaO2 monitoring. Patient is hypertensive. Patient opens eyes to verbal stimulation. Is oriented to person, place, time and situation. Patient follows commands. Patient moves all four extremities equally bilaterally. Continue to monitor.

## 2021-06-10 NOTE — ED NOTES
Bedside handoff given to DENA Garcia. Care of patient relinquished. Patient continues to have regular and unlabored respirations. Rate 18-22 breaths/min. Patient on 10 LPM Oxymask. This RN weaned FiO2 to keep SaO2 > 94%. Patient updated on continued POC and waits. Denies any needs. Continue to monitor.

## 2021-06-10 NOTE — PROGRESS NOTES
[facility]    Hospitalist Progress Note    Date of Service (when I saw the patient): 06/10/2021    Assessment & Plan   Anneliese Oshea is a 71 year old female who was admitted on 6/10/2021.  IMPRESSION AND PLAN:  Ms. Oshea is a 71-year-old female with a past medical history significant for COPD, coronary artery disease and hypertension, who presents to the Emergency Department with increasing shortness of breath and found to have a COPD exacerbation.  1.  Chronic obstructive pulmonary disease exacerbation with acute hypoxic  hypercapnic respiratory failure:  The patient did have improvement in her pCO2 from 57 down to 46 and improvement in her pH from 7.27 to 7.35.  With initiation of BiPAP.  At this point, I will continue with the face mask oxygen and will have BiPAP available as needed.      We will continue with Solu-Medrol scheduled DuoNebs and p.r.n. albuterol nebs.      There is no evidence of pneumonia on CT of the chest, which she does have a white count and a somewhat productive cough, so we will continue with azithromycin for the possibility of bronchitis given her underlying COPD.      May benefit from something like Advair or Spiriva once we can get her breathing better, so she can take deeper breaths to allow the medicine to work better.  If she is willing may be worthwhile to try something like this on discharge.    Pt is on 6-10liters of O2 by face mask   Actively wheezing . Continue current cares   Robitussin DM for cough PRN   BIPAP PRN     2.  Borderline aneurysmal ascending thoracic aorta:  Noted to be 4.0 cm on a CT of the chest.  We will ask the patient to follow up with PCP for surveillance.      3.  Hypertension:    We will continue with PTA regimen once confirmed, but appears to include metoprolol.  4.  Dyslipidemia:  Appears to be on lovastatin, which can be continued upon discharge.  5.  Deep venous thrombosis prophylaxis:  SCDs.  6.  Disposition:  I anticipate at  least 2 nights in the hospital to allow further improvement in her respiratory status before discharge home.         Code Status: Full Code        Lilia Morin MD  [phone] (P)      Interval History     Patient is seen and examined.  Is resting comfortably in bed with facemask.  Still actively wheezing.   Shortness of breath slowly improving    -Data reviewed today: I reviewed all new labs and imaging results over the last 24 hours. I personally reviewed no images or EKG's today.    Physical Exam   Temp: 96.2  F (35.7  C) Temp src: Temporal BP: 126/82 Pulse: 82   Resp: 21 SpO2: 96 % O2 Device: Oxymask Oxygen Delivery: 6 LPM  There were no vitals filed for this visit.  Vital Signs with Ranges  Temp:  [96.2  F (35.7  C)] 96.2  F (35.7  C)  Pulse:  [] 82  Resp:  [10-33] 21  BP: (107-163)/() 126/82  FiO2 (%):  [40 %-50 %] 40 %  SpO2:  [96 %-100 %] 96 %  No intake/output data recorded.    Constitutional: Awake, alert, cooperative, no apparent distress  Respiratory: Bilateral expiratory wheezing heard on auscultation.  No crackles, diminished air entry bilaterally  Cardiovascular: Regular rate and rhythm, normal S1 and S2, and no murmur noted  GI: Normal bowel sounds, soft, non-distended, non-tender  Skin/Integumen: No rashes, no cyanosis, no edema  Other:     Medications     lactated ringers 60 mL/hr at 06/10/21 1100       [START ON 6/11/2021] azithromycin  250 mg Oral Daily     ipratropium - albuterol 0.5 mg/2.5 mg/3 mL  3 mL Nebulization Q4H While awake     methylPREDNISolone  62.5 mg Intravenous Q8H     sodium chloride (PF)  3 mL Intracatheter Q8H       Data   Recent Labs   Lab 06/10/21  0252   WBC 12.5*   HGB 14.4   MCV 99      INR 0.91      POTASSIUM 3.7   CHLORIDE 105   CO2 24   BUN 18   CR 0.86   ANIONGAP 7   ENDY 9.1   *   ALBUMIN 3.8   PROTTOTAL 7.5   BILITOTAL 0.4   ALKPHOS 92   ALT 19   AST 12   TROPI <0.015       Recent Results (from the past 24 hour(s))   XR Chest Port 1  View    Narrative    EXAM: XR CHEST PORT 1 VIEW  LOCATION: [facility]  DATE/TIME: 6/10/2021 3:00 AM    INDICATION: Shortness of breath.  COMPARISON: None.      Impression    IMPRESSION: Cardiomediastinal silhouette within normal limits. Calcified granuloma left lung base. No vascular congestion or pleural effusion. Slight interstitial prominence upper lungs. Coronary artery stent. Atherosclerotic aorta.   CT Chest Pulmonary Embolism w Contrast    Narrative    EXAM: CT CHEST PULMONARY EMBOLISM W CONTRAST  LOCATION: [facility]  DATE/TIME: 6/10/2021 5:02 AM    INDICATION: Shortness of breath.  COMPARISON: None.  TECHNIQUE: CT chest pulmonary angiogram during arterial phase injection of IV contrast. Multiplanar reformats and MIP reconstructions were performed. Dose reduction techniques were used.   CONTRAST: 57 mL Isovue-370.    FINDINGS:  ANGIOGRAM CHEST: Pulmonary arteries are normal caliber and negative for pulmonary emboli. The ascending thoracic aorta is borderline aneurysmal at 4.0 cm. The aorta is poorly opacified limiting evaluation for dissection. The heart size is normal.    LUNGS AND PLEURA: Calcified granuloma at the left lung base. Mild peripheral fibrotic changes. Mild emphysematous disease in the lung apices. No pneumothorax or pleural effusion.    MEDIASTINUM/AXILLAE: Calcified lymph nodes in the left hilum. No lymph node enlargement.    CORONARY ARTERY CALCIFICATION: Severe.    UPPER ABDOMEN: Calcified granulomas in the spleen.    MUSCULOSKELETAL: Mild degenerative disease in the spine.      Impression    IMPRESSION:  1.  There is no pulmonary embolus.  2.  Borderline aneurysmal ascending thoracic aorta measuring 4.0 cm. Evaluation for aortic dissection is limited by suboptimal opacification of the aorta.  3.  Coronary artery atherosclerotic calcifications.

## 2021-06-10 NOTE — SIGNIFICANT EVENT
RRT called for hypoxia following a coughing spell. Upon arrival in room patient was @ 79% on 15L Oxymask but oxygenation improving. Bipap initiated again and duoneb given, improved. CXR ordered/completed. Plan to rotate between bipap/oxymask as tolerated.

## 2021-06-10 NOTE — ED NOTES
Patient removed from BiPap and placed on Oxymask @ 10 LPM. Respirations are regular and unlabored. Patient resting quietly on cart. Continue to monitor.

## 2021-06-10 NOTE — CODE/RAPID RESPONSE
[facility]    RRT Note  6/10/2021   Time Called: 3: 21 PM     RRT called for: respiratory distress    Assessment & Plan   Respiratory distress secondary to COPD exacerbation  RRT called for significant respiratory distress. BiPap placed with near immediately relief of distress. Right sided lung sounds slightly more diminished.     INTERVENTIONS:  - Chest Xray without acute infiltrates or other obvious acute issue  - BiPap; can rotate between BiPap and NC or mask for comfort   - VBG    Discussed with and defer further cares to Dr. Morin, Hospitalist.     Code Status: Full Code     Rubia Bryan, APRN, CNP  Hospitalist Service, House Officer  [facility]     Text Page  Pager: [phone]    Allergies   No Known Allergies    Physical Exam   Vital Signs with Ranges:  Temp:  [96.2  F (35.7  C)] 96.2  F (35.7  C)  Pulse:  [] 82  Resp:  [10-33] 21  BP: (107-163)/() 126/82  FiO2 (%):  [40 %-50 %] 40 %  SpO2:  [96 %-100 %] 96 %  I/O last 3 completed shifts:  In: 1287.08 [P.O.:360; I.V.:927.08]  Out: 850 [Urine:850]    Constitutional: 71- year old female with respiratory distress.   Pulmonary: Lungs diminished, right more than left. Significant respiratory distress.   Cardiovascular: S1, S2 without obvious murmur, rub, or gallop. She appears adequately perfused.   GI: Soft, non-tender.   Skin/Integumen: No obvious concerning rashes or lesions on exposed skin.   Neuro: Awake, alert, oriented x 4. Non-focal.   Psych:  Anxious.   Extremities: Moves all extremities.     IMAGING: (X-ray/CT/MRI)   Recent Results (from the past 24 hour(s))   XR Chest Port 1 View    Narrative    EXAM: XR CHEST PORT 1 VIEW  LOCATION: [facility]  DATE/TIME: 6/10/2021 3:00 AM    INDICATION: Shortness of breath.  COMPARISON: None.      Impression    IMPRESSION: Cardiomediastinal silhouette within normal limits. Calcified granuloma left lung base. No vascular congestion or pleural  effusion. Slight interstitial prominence upper lungs. Coronary artery stent. Atherosclerotic aorta.   CT Chest Pulmonary Embolism w Contrast    Narrative    EXAM: CT CHEST PULMONARY EMBOLISM W CONTRAST  LOCATION: [facility]  DATE/TIME: 6/10/2021 5:02 AM    INDICATION: Shortness of breath.  COMPARISON: None.  TECHNIQUE: CT chest pulmonary angiogram during arterial phase injection of IV contrast. Multiplanar reformats and MIP reconstructions were performed. Dose reduction techniques were used.   CONTRAST: 57 mL Isovue-370.    FINDINGS:  ANGIOGRAM CHEST: Pulmonary arteries are normal caliber and negative for pulmonary emboli. The ascending thoracic aorta is borderline aneurysmal at 4.0 cm. The aorta is poorly opacified limiting evaluation for dissection. The heart size is normal.    LUNGS AND PLEURA: Calcified granuloma at the left lung base. Mild peripheral fibrotic changes. Mild emphysematous disease in the lung apices. No pneumothorax or pleural effusion.    MEDIASTINUM/AXILLAE: Calcified lymph nodes in the left hilum. No lymph node enlargement.    CORONARY ARTERY CALCIFICATION: Severe.    UPPER ABDOMEN: Calcified granulomas in the spleen.    MUSCULOSKELETAL: Mild degenerative disease in the spine.      Impression    IMPRESSION:  1.  There is no pulmonary embolus.  2.  Borderline aneurysmal ascending thoracic aorta measuring 4.0 cm. Evaluation for aortic dissection is limited by suboptimal opacification of the aorta.  3.  Coronary artery atherosclerotic calcifications.               XR Chest Port 1 View    Narrative    CHEST ONE VIEW  6/10/2021 3:55 PM     HISTORY: Worsening respiratory distress, slightly decreased right  breath sounds.    COMPARISON: 6/10/2020      Impression    IMPRESSION: No acute infiltrates. Stable granulomatous change.    KARLIE VELIZ MD       CBC with Diff:  Recent Labs   Lab Test 06/10/21  0252   WBC 12.5*   HGB 14.4   MCV 99      INR 0.91        Lactic Acid:    Lab  Results   Component Value Date    LACT 1.6 06/10/2021           Comprehensive Metabolic Panel:  Recent Labs   Lab 06/10/21  0252      POTASSIUM 3.7   CHLORIDE 105   CO2 24   ANIONGAP 7   *   BUN 18   CR 0.86   GFRESTIMATED 69   GFRESTBLACK 80   ENDY 9.1   MAG 2.2   PROTTOTAL 7.5   ALBUMIN 3.8   BILITOTAL 0.4   ALKPHOS 92   AST 12   ALT 19       INR:    Recent Labs   Lab Test 06/10/21  0252   INR 0.91       Time Spent on this Encounter   I spent 30 minutes of critical care time on the unit/floor managing the care of Anneliese Oshea. Upon evaluation, this patient had a high probability of imminent or life-threatening deterioration due to worsening respiratory distress, which required my direct attention, intervention, and personal management. 100% of my time was spent at the bedside counseling the patient and/or coordinating care regarding services listed in this note.

## 2021-06-11 LAB
ANION GAP SERPL CALCULATED.3IONS-SCNC: 5 MMOL/L (ref 3–14)
BUN SERPL-MCNC: 14 MG/DL (ref 7–30)
CALCIUM SERPL-MCNC: 9.4 MG/DL (ref 8.5–10.1)
CHLORIDE SERPL-SCNC: 104 MMOL/L (ref 94–109)
CO2 SERPL-SCNC: 26 MMOL/L (ref 20–32)
CREAT SERPL-MCNC: 0.66 MG/DL (ref 0.52–1.04)
ERYTHROCYTE [DISTWIDTH] IN BLOOD BY AUTOMATED COUNT: 12.6 % (ref 10–15)
GFR SERPL CREATININE-BSD FRML MDRD: >90 ML/MIN/{1.73_M2}
GLUCOSE SERPL-MCNC: 121 MG/DL (ref 70–99)
HCT VFR BLD AUTO: 40.6 % (ref 35–47)
HGB BLD-MCNC: 13.3 G/DL (ref 11.7–15.7)
MCH RBC QN AUTO: 31.7 PG (ref 26.5–33)
MCHC RBC AUTO-ENTMCNC: 32.8 G/DL (ref 31.5–36.5)
MCV RBC AUTO: 97 FL (ref 78–100)
PLATELET # BLD AUTO: 283 10E9/L (ref 150–450)
POTASSIUM SERPL-SCNC: 3.7 MMOL/L (ref 3.4–5.3)
RBC # BLD AUTO: 4.2 10E12/L (ref 3.8–5.2)
SODIUM SERPL-SCNC: 135 MMOL/L (ref 133–144)
WBC # BLD AUTO: 12.5 10E9/L (ref 4–11)

## 2021-06-11 PROCEDURE — 258N000003 HC RX IP 258 OP 636: Performed by: INTERNAL MEDICINE

## 2021-06-11 PROCEDURE — 36415 COLL VENOUS BLD VENIPUNCTURE: CPT | Performed by: INTERNAL MEDICINE

## 2021-06-11 PROCEDURE — 94640 AIRWAY INHALATION TREATMENT: CPT

## 2021-06-11 PROCEDURE — 94640 AIRWAY INHALATION TREATMENT: CPT | Mod: 76

## 2021-06-11 PROCEDURE — 80048 BASIC METABOLIC PNL TOTAL CA: CPT | Performed by: INTERNAL MEDICINE

## 2021-06-11 PROCEDURE — 250N000013 HC RX MED GY IP 250 OP 250 PS 637: Performed by: INTERNAL MEDICINE

## 2021-06-11 PROCEDURE — 99233 SBSQ HOSP IP/OBS HIGH 50: CPT | Performed by: INTERNAL MEDICINE

## 2021-06-11 PROCEDURE — 250N000009 HC RX 250: Performed by: INTERNAL MEDICINE

## 2021-06-11 PROCEDURE — 999N000157 HC STATISTIC RCP TIME EA 10 MIN

## 2021-06-11 PROCEDURE — 120N000001 HC R&B MED SURG/OB

## 2021-06-11 PROCEDURE — 85027 COMPLETE CBC AUTOMATED: CPT | Performed by: INTERNAL MEDICINE

## 2021-06-11 PROCEDURE — 250N000011 HC RX IP 250 OP 636: Performed by: INTERNAL MEDICINE

## 2021-06-11 RX ORDER — NICOTINE 21 MG/24HR
1 PATCH, TRANSDERMAL 24 HOURS TRANSDERMAL EVERY 24 HOURS
Status: DISCONTINUED | OUTPATIENT
Start: 2021-06-11 | End: 2021-06-13 | Stop reason: HOSPADM

## 2021-06-11 RX ORDER — METOPROLOL SUCCINATE 25 MG/1
25 TABLET, EXTENDED RELEASE ORAL DAILY
Status: DISCONTINUED | OUTPATIENT
Start: 2021-06-11 | End: 2021-06-13 | Stop reason: HOSPADM

## 2021-06-11 RX ADMIN — TRAMADOL HYDROCHLORIDE 50 MG: 50 TABLET, FILM COATED ORAL at 21:08

## 2021-06-11 RX ADMIN — IPRATROPIUM BROMIDE AND ALBUTEROL SULFATE 3 ML: .5; 3 SOLUTION RESPIRATORY (INHALATION) at 12:31

## 2021-06-11 RX ADMIN — METHYLPREDNISOLONE SODIUM SUCCINATE 62.5 MG: 125 INJECTION, POWDER, FOR SOLUTION INTRAMUSCULAR; INTRAVENOUS at 03:47

## 2021-06-11 RX ADMIN — SODIUM CHLORIDE, POTASSIUM CHLORIDE, SODIUM LACTATE AND CALCIUM CHLORIDE: 600; 310; 30; 20 INJECTION, SOLUTION INTRAVENOUS at 11:26

## 2021-06-11 RX ADMIN — METHYLPREDNISOLONE SODIUM SUCCINATE 62.5 MG: 125 INJECTION, POWDER, FOR SOLUTION INTRAMUSCULAR; INTRAVENOUS at 20:55

## 2021-06-11 RX ADMIN — CYCLOBENZAPRINE HYDROCHLORIDE 5 MG: 5 TABLET, FILM COATED ORAL at 14:05

## 2021-06-11 RX ADMIN — METOPROLOL SUCCINATE 25 MG: 25 TABLET, EXTENDED RELEASE ORAL at 14:05

## 2021-06-11 RX ADMIN — TRAMADOL HYDROCHLORIDE 50 MG: 50 TABLET, FILM COATED ORAL at 04:00

## 2021-06-11 RX ADMIN — IPRATROPIUM BROMIDE AND ALBUTEROL SULFATE 3 ML: .5; 3 SOLUTION RESPIRATORY (INHALATION) at 03:48

## 2021-06-11 RX ADMIN — AZITHROMYCIN MONOHYDRATE 250 MG: 250 TABLET ORAL at 11:12

## 2021-06-11 RX ADMIN — IPRATROPIUM BROMIDE AND ALBUTEROL SULFATE 3 ML: .5; 3 SOLUTION RESPIRATORY (INHALATION) at 09:22

## 2021-06-11 RX ADMIN — METHYLPREDNISOLONE SODIUM SUCCINATE 62.5 MG: 125 INJECTION, POWDER, FOR SOLUTION INTRAMUSCULAR; INTRAVENOUS at 11:12

## 2021-06-11 RX ADMIN — TRAMADOL HYDROCHLORIDE 50 MG: 50 TABLET, FILM COATED ORAL at 14:05

## 2021-06-11 RX ADMIN — PANTOPRAZOLE SODIUM 40 MG: 40 TABLET, DELAYED RELEASE ORAL at 11:12

## 2021-06-11 RX ADMIN — GUAIFENESIN AND DEXTROMETHORPHAN 10 ML: 100; 10 SYRUP ORAL at 21:03

## 2021-06-11 RX ADMIN — ALBUTEROL SULFATE 2.5 MG: 2.5 SOLUTION RESPIRATORY (INHALATION) at 16:19

## 2021-06-11 RX ADMIN — IPRATROPIUM BROMIDE AND ALBUTEROL SULFATE 3 ML: .5; 3 SOLUTION RESPIRATORY (INHALATION) at 19:59

## 2021-06-11 ASSESSMENT — ACTIVITIES OF DAILY LIVING (ADL)
ADLS_ACUITY_SCORE: 21

## 2021-06-11 NOTE — PLAN OF CARE
A&O. Anxious at times. Continues on Bipap overnight. O2 decreased to 30%fio2. LS with wheezes. Tele SR/ST. Dry non productive cough present. Tramadol and flexeril for pain. Purewick in place.

## 2021-06-11 NOTE — PROGRESS NOTES
[facility]    Hospitalist Progress Note    Date of Service (when I saw the patient): 06/11/2021    Assessment & Plan   Anneliese Oshea is a 71 year old female who was admitted on 6/10/2021.  IMPRESSION AND PLAN:  Ms. Oshea is a 71-year-old female with a past medical history significant for COPD, coronary artery disease and hypertension, who presents to the Emergency Department with increasing shortness of breath and found to have a COPD exacerbation.  1.  Chronic obstructive pulmonary disease exacerbation with acute hypoxic  hypercapnic respiratory failure:  The patient did have improvement in her pCO2 from 57 down to 46 and improvement in her pH from 7.27 to 7.35.  With initiation of BiPAP.  At this point, I will continue with the face mask oxygen and will have BiPAP available as needed.      We will continue with Solu-Medrol scheduled DuoNebs and p.r.n. albuterol nebs.      There is no evidence of pneumonia on CT of the chest, which she does have a white count and a somewhat productive cough, so we will continue with azithromycin for the possibility of bronchitis given her underlying COPD.      May benefit from something like Advair or Spiriva once we can get her breathing better, so she can take deeper breaths to allow the medicine to work better.  If she is willing may be worthwhile to try something like this on discharge  Robitussin DM for cough PRN   Was on BIPAP yesterday and off of BIPAP trial today     BIPAP PRN for SOB /wheezing     2.  Borderline aneurysmal ascending thoracic aorta:  Noted to be 4.0 cm on a CT of the chest.  We will ask the patient to follow up with PCP for surveillance.      3.  Hypertension:    We will continue with PTA regimen once confirmed, but appears to include metoprolol.  toprol xl restarted today     4.  Dyslipidemia:  Appears to be on lovastatin, which can be continued upon discharge.  5.  Deep venous thrombosis prophylaxis:  SCDs.         Code Status:  Full Code    Diposition: in the next 2days if respiratory status remains stable  Discussed with bedside RN, patient and RT today    Lilia Morin MD  [phone] (P)      Interval History     Patient is seen and examined.  Is resting comfortably in bed with BiPAP on.  Wheezing improved today.  Will trial off of BiPAP this morning and see how she does    -Data reviewed today: I reviewed all new labs and imaging results over the last 24 hours. I personally reviewed no images or EKG's today.    Physical Exam   Temp: 97.5  F (36.4  C) Temp src: Oral BP: (!) 140/92 Pulse: 108   Resp: 30 SpO2: 97 % O2 Device: Oxymizer cannula Oxygen Delivery: 7 LPM  There were no vitals filed for this visit.  Vital Signs with Ranges  Temp:  [97.2  F (36.2  C)-97.5  F (36.4  C)] 97.5  F (36.4  C)  Pulse:  [] 108  Resp:  [11-30] 30  BP: ()/() 140/92  FiO2 (%):  [30 %-70 %] 30 %  SpO2:  [92 %-100 %] 97 %  I/O last 3 completed shifts:  In: 2527.08 [P.O.:460; I.V.:2067.08]  Out: 1100 [Urine:1100]    Constitutional: Awake, alert, cooperative, no apparent distress  Respiratory: Diminished air entry bilaterally no crackles or wheezing today  cardiovascular: Regular rate and rhythm, normal S1 and S2, and no murmur noted  GI: Normal bowel sounds, soft, non-distended, non-tender  Skin/Integumen: No rashes, no cyanosis, no edema  Other:     Medications     lactated ringers 60 mL/hr at 06/11/21 1126     - MEDICATION INSTRUCTIONS -       - MEDICATION INSTRUCTIONS -         azithromycin  250 mg Oral Daily     ipratropium - albuterol 0.5 mg/2.5 mg/3 mL  3 mL Nebulization Q4H While awake     methylPREDNISolone  62.5 mg Intravenous Q8H     metoprolol succinate ER  25 mg Oral Daily     nicotine  1 patch Transdermal Q24H     pantoprazole  40 mg Oral QAM AC     sodium chloride (PF)  3 mL Intracatheter Q8H       Data   Recent Labs   Lab 06/11/21  0647 06/10/21  0252   WBC 12.5* 12.5*   HGB 13.3 14.4   MCV 97 99    298   INR  --  0.91     136   POTASSIUM 3.7 3.7   CHLORIDE 104 105   CO2 26 24   BUN 14 18   CR 0.66 0.86   ANIONGAP 5 7   ENDY 9.4 9.1   * 169*   ALBUMIN  --  3.8   PROTTOTAL  --  7.5   BILITOTAL  --  0.4   ALKPHOS  --  92   ALT  --  19   AST  --  12   TROPI  --  <0.015       Recent Results (from the past 24 hour(s))   XR Chest Port 1 View    Narrative    CHEST ONE VIEW  6/10/2021 3:55 PM     HISTORY: Worsening respiratory distress, slightly decreased right  breath sounds.    COMPARISON: 6/10/2020      Impression    IMPRESSION: No acute infiltrates. Stable granulomatous change.    KARLIE VELIZ MD

## 2021-06-11 NOTE — PROVIDER NOTIFICATION
MD Notification    Notified Person: MD    Notified Person Name: Dr. Morin    Notification Date/Time: 6/11/21 @ 1131    Notification Interaction: page sent    Purpose of Notification: Diet order    Orders Received:    Comments:    *second page sent at 8018.

## 2021-06-12 ENCOUNTER — APPOINTMENT (OUTPATIENT)
Dept: PHYSICAL THERAPY | Facility: CLINIC | Age: 71
DRG: 189 | End: 2021-06-12
Attending: INTERNAL MEDICINE
Payer: MEDICARE

## 2021-06-12 PROCEDURE — 250N000012 HC RX MED GY IP 250 OP 636 PS 637: Performed by: INTERNAL MEDICINE

## 2021-06-12 PROCEDURE — 97530 THERAPEUTIC ACTIVITIES: CPT | Mod: GP

## 2021-06-12 PROCEDURE — 250N000013 HC RX MED GY IP 250 OP 250 PS 637: Performed by: INTERNAL MEDICINE

## 2021-06-12 PROCEDURE — 258N000003 HC RX IP 258 OP 636: Performed by: INTERNAL MEDICINE

## 2021-06-12 PROCEDURE — 250N000011 HC RX IP 250 OP 636: Performed by: INTERNAL MEDICINE

## 2021-06-12 PROCEDURE — 999N000157 HC STATISTIC RCP TIME EA 10 MIN

## 2021-06-12 PROCEDURE — 99232 SBSQ HOSP IP/OBS MODERATE 35: CPT | Performed by: INTERNAL MEDICINE

## 2021-06-12 PROCEDURE — 94640 AIRWAY INHALATION TREATMENT: CPT | Mod: 76

## 2021-06-12 PROCEDURE — 250N000009 HC RX 250: Performed by: INTERNAL MEDICINE

## 2021-06-12 PROCEDURE — 120N000001 HC R&B MED SURG/OB

## 2021-06-12 PROCEDURE — 97116 GAIT TRAINING THERAPY: CPT | Mod: GP

## 2021-06-12 PROCEDURE — 97161 PT EVAL LOW COMPLEX 20 MIN: CPT | Mod: GP

## 2021-06-12 PROCEDURE — 94640 AIRWAY INHALATION TREATMENT: CPT

## 2021-06-12 PROCEDURE — 99207 PR CDG-MDM COMPONENT: MEETS MODERATE - DOWN CODED: CPT | Performed by: INTERNAL MEDICINE

## 2021-06-12 RX ORDER — PREDNISONE 20 MG/1
60 TABLET ORAL DAILY
Status: DISCONTINUED | OUTPATIENT
Start: 2021-06-12 | End: 2021-06-13 | Stop reason: HOSPADM

## 2021-06-12 RX ORDER — GUAIFENESIN 600 MG/1
600 TABLET, EXTENDED RELEASE ORAL 2 TIMES DAILY
Status: DISCONTINUED | OUTPATIENT
Start: 2021-06-12 | End: 2021-06-13 | Stop reason: HOSPADM

## 2021-06-12 RX ADMIN — GUAIFENESIN AND DEXTROMETHORPHAN 10 ML: 100; 10 SYRUP ORAL at 03:51

## 2021-06-12 RX ADMIN — GUAIFENESIN AND DEXTROMETHORPHAN 10 ML: 100; 10 SYRUP ORAL at 18:43

## 2021-06-12 RX ADMIN — IPRATROPIUM BROMIDE AND ALBUTEROL SULFATE 3 ML: .5; 3 SOLUTION RESPIRATORY (INHALATION) at 19:51

## 2021-06-12 RX ADMIN — GUAIFENESIN 600 MG: 600 TABLET, EXTENDED RELEASE ORAL at 20:58

## 2021-06-12 RX ADMIN — METHYLPREDNISOLONE SODIUM SUCCINATE 62.5 MG: 125 INJECTION, POWDER, FOR SOLUTION INTRAMUSCULAR; INTRAVENOUS at 03:32

## 2021-06-12 RX ADMIN — IPRATROPIUM BROMIDE AND ALBUTEROL SULFATE 3 ML: .5; 3 SOLUTION RESPIRATORY (INHALATION) at 23:02

## 2021-06-12 RX ADMIN — METOPROLOL SUCCINATE 25 MG: 25 TABLET, EXTENDED RELEASE ORAL at 09:53

## 2021-06-12 RX ADMIN — TRAMADOL HYDROCHLORIDE 50 MG: 50 TABLET, FILM COATED ORAL at 11:26

## 2021-06-12 RX ADMIN — SODIUM CHLORIDE, POTASSIUM CHLORIDE, SODIUM LACTATE AND CALCIUM CHLORIDE: 600; 310; 30; 20 INJECTION, SOLUTION INTRAVENOUS at 03:28

## 2021-06-12 RX ADMIN — TRAMADOL HYDROCHLORIDE 50 MG: 50 TABLET, FILM COATED ORAL at 23:01

## 2021-06-12 RX ADMIN — GUAIFENESIN AND DEXTROMETHORPHAN 10 ML: 100; 10 SYRUP ORAL at 09:55

## 2021-06-12 RX ADMIN — PREDNISONE 60 MG: 20 TABLET ORAL at 13:23

## 2021-06-12 RX ADMIN — PANTOPRAZOLE SODIUM 40 MG: 40 TABLET, DELAYED RELEASE ORAL at 06:51

## 2021-06-12 RX ADMIN — CYCLOBENZAPRINE HYDROCHLORIDE 5 MG: 5 TABLET, FILM COATED ORAL at 11:25

## 2021-06-12 RX ADMIN — CYCLOBENZAPRINE HYDROCHLORIDE 5 MG: 5 TABLET, FILM COATED ORAL at 00:09

## 2021-06-12 RX ADMIN — AZITHROMYCIN MONOHYDRATE 250 MG: 250 TABLET ORAL at 09:53

## 2021-06-12 RX ADMIN — IPRATROPIUM BROMIDE AND ALBUTEROL SULFATE 3 ML: .5; 3 SOLUTION RESPIRATORY (INHALATION) at 03:38

## 2021-06-12 RX ADMIN — CYCLOBENZAPRINE HYDROCHLORIDE 5 MG: 5 TABLET, FILM COATED ORAL at 23:01

## 2021-06-12 RX ADMIN — IPRATROPIUM BROMIDE AND ALBUTEROL SULFATE 3 ML: .5; 3 SOLUTION RESPIRATORY (INHALATION) at 12:35

## 2021-06-12 RX ADMIN — IPRATROPIUM BROMIDE AND ALBUTEROL SULFATE 3 ML: .5; 3 SOLUTION RESPIRATORY (INHALATION) at 16:53

## 2021-06-12 RX ADMIN — GUAIFENESIN 600 MG: 600 TABLET, EXTENDED RELEASE ORAL at 13:23

## 2021-06-12 RX ADMIN — IPRATROPIUM BROMIDE AND ALBUTEROL SULFATE 3 ML: .5; 3 SOLUTION RESPIRATORY (INHALATION) at 06:53

## 2021-06-12 ASSESSMENT — ACTIVITIES OF DAILY LIVING (ADL)
ADLS_ACUITY_SCORE: 21

## 2021-06-12 NOTE — PROGRESS NOTES
[facility]    Hospitalist Progress Note    Date of Service (when I saw the patient): 06/12/2021    Assessment & Plan   Anneliese Oshea is a 71 year old female who was admitted on 6/10/2021.  IMPRESSION AND PLAN:  Ms. Oshea is a 71-year-old female with a past medical history significant for COPD, coronary artery disease and hypertension, who presents to the Emergency Department with increasing shortness of breath and found to have a COPD exacerbation.  1.  Chronic obstructive pulmonary disease exacerbation with acute hypoxic  hypercapnic respiratory failure:  The patient did have improvement in her pCO2 from 57 down to 46 and improvement in her pH from 7.27 to 7.35.  With initiation of BiPAP.  At this point, I will continue with the face mask oxygen and will have BiPAP available as needed.      We will continue with Solu-Medrol scheduled DuoNebs and p.r.n. albuterol nebs.  We will switch IV Solu-Medrol to prednisone 60 mg p.o. daily today      There is no evidence of pneumonia on CT of the chest, which she does have a white count and a somewhat productive cough, so we will continue with azithromycin for the possibility of bronchitis given her underlying COPD.      May benefit from something like Advair or Spiriva once we can get her breathing better, so she can take deeper breaths to allow the medicine to work better.  If she is willing may be worthwhile to try something like this on discharge    Robitussin DM for cough PRN   Able to come off of BiPAP on 6/11/2021 and is stable on 3 L of oxygen by Oxymizer today    BIPAP PRN for SOB /wheezing     2.  Borderline aneurysmal ascending thoracic aorta:  Noted to be 4.0 cm on a CT of the chest.  We will ask the patient to follow up with PCP for surveillance.      3.  Hypertension:    We will continue with PTA regimen once confirmed, but appears to include metoprolol.  toprol xl restarted today     4.  Dyslipidemia:  Appears to be on lovastatin, which  can be continued upon discharge.  5.  Deep venous thrombosis prophylaxis:  SCDs.         Code Status: Full Code    Diposition: in the next 1- 2days if respiratory status remains stable.  PT consult requested today recommended discharge to home when stable  Discussed with bedside RN, patient today     Lilia Morin MD  [phone] (P)      Interval History     Patient is seen and examined.  Shortness of breath improving.  Respiratory status stable on 3 L of oxygen by Oxymizer.  Coughed up some brown-colored sputum this morning.    -Data reviewed today: I reviewed all new labs and imaging results over the last 24 hours. I personally reviewed no images or EKG's today.    Physical Exam   Temp: 97.8  F (36.6  C) Temp src: Oral BP: 125/70 Pulse: 87   Resp: 20 SpO2: 96 % O2 Device: Oxymizer cannula Oxygen Delivery: 3 LPM  There were no vitals filed for this visit.  Vital Signs with Ranges  Temp:  [97.4  F (36.3  C)-98.1  F (36.7  C)] 97.8  F (36.6  C)  Pulse:  [] 87  Resp:  [12-28] 20  BP: (102-133)/(52-93) 125/70  SpO2:  [94 %-100 %] 96 %  I/O last 3 completed shifts:  In: 326 [I.V.:326]  Out: 1800 [Urine:1800]    Constitutional: Awake, alert, cooperative, no apparent distress  Respiratory: Diminished air entry bilaterally.  Occasional wheezing heard on auscultation today  cardiovascular: Regular rate and rhythm, normal S1 and S2, and no murmur noted  GI: Normal bowel sounds, soft, non-distended, non-tender  Skin/Integumen: No rashes, no cyanosis, no edema  Other:     Medications     - MEDICATION INSTRUCTIONS -       - MEDICATION INSTRUCTIONS -         azithromycin  250 mg Oral Daily     ipratropium - albuterol 0.5 mg/2.5 mg/3 mL  3 mL Nebulization Q4H While awake     metoprolol succinate ER  25 mg Oral Daily     nicotine  1 patch Transdermal Q24H     nicotine   Transdermal Q8H     pantoprazole  40 mg Oral QAM AC     predniSONE  60 mg Oral Daily     sodium chloride (PF)  3 mL Intracatheter Q8H       Data   Recent  Labs   Lab 06/11/21  0647 06/10/21  0252   WBC 12.5* 12.5*   HGB 13.3 14.4   MCV 97 99    298   INR  --  0.91    136   POTASSIUM 3.7 3.7   CHLORIDE 104 105   CO2 26 24   BUN 14 18   CR 0.66 0.86   ANIONGAP 5 7   ENDY 9.4 9.1   * 169*   ALBUMIN  --  3.8   PROTTOTAL  --  7.5   BILITOTAL  --  0.4   ALKPHOS  --  92   ALT  --  19   AST  --  12   TROPI  --  <0.015       No results found for this or any previous visit (from the past 24 hour(s)).

## 2021-06-12 NOTE — PROGRESS NOTES
06/12/21 0900   Quick Adds   Type of Visit Initial PT Evaluation   Living Environment   People in home alone   Current Living Arrangements apartment   Home Accessibility no concerns   Transportation Anticipated car, drives self   Self-Care   Usual Activity Tolerance good   Current Activity Tolerance moderate   Regular Exercise No   Equipment Currently Used at Home none   Disability/Function   Fall history within last six months no   General Information   Onset of Illness/Injury or Date of Surgery 06/10/21   Referring Physician Dr. Crain   Patient/Family Therapy Goals Statement (PT) To go home   Pertinent History of Current Problem (include personal factors and/or comorbidities that impact the POC) Pt is a 71 year old female admitted for COPD exacerbation   Cognition   Orientation Status (Cognition) oriented x 4   Pain Assessment   Patient Currently in Pain No   Range of Motion (ROM)   ROM Quick Adds ROM WFL   Strength   Manual Muscle Testing Quick Adds Strength WFL   Bed Mobility   Comment (Bed Mobility) SBA   Transfers   Transfer Safety Comments CGA   Gait/Stairs (Locomotion)   Comment (Gait/Stairs) 25' no AD CGA, decreased PATY   Balance   Balance Comments Good in sitting, fair in standing   Clinical Impression   Criteria for Skilled Therapeutic Intervention yes, treatment indicated   PT Diagnosis (PT) Impaired ambulation   Influenced by the following impairments Decreased endurance, decreased balance   Functional limitations due to impairments Difficulty with bed mobility, transfers, ambulation   Clinical Presentation Stable/Uncomplicated   Clinical Presentation Rationale VSS, pain controlled   Clinical Decision Making (Complexity) low complexity   Therapy Frequency (PT) 5x/week   Predicted Duration of Therapy Intervention (days/wks) 1 week   Planned Therapy Interventions (PT) balance training;bed mobility training;gait training;patient/family education;transfer training   Risk & Benefits of therapy have been  explained evaluation/treatment results reviewed;care plan/treatment goals reviewed;risks/benefits reviewed;current/potential barriers reviewed;participants voiced agreement with care plan;participants included;patient   PT Discharge Planning    PT Discharge Recommendation (DC Rec) home   PT Rationale for DC Rec Anticipate with further medical management and therapy, patient will progress to independence and be safe to discharge home.   PT Brief overview of current status  SBA to CGA for mobility   Total Evaluation Time   Total Evaluation Time (Minutes) 10

## 2021-06-12 NOTE — PROGRESS NOTES
VS WNL. A/Ox4. Pain controlled with tramadol and flexeril. Up with assist of 1. Regular diet. BS WNL. Voiding via PureWick. LS inspiratory and expiratory wheeze, bronchial congestion. IS performed independently to 500. Weaned from 7L oxyimyzer to 3L until 0345, when sats dropped to 89%. Gave a neb (missed the 2300 neb due to sleeping), Robitussin and scheduled Solu-Medrol. About 5 minutes post neb, HR spiked to 187, dropped to 120s and sats dropped to 85%. Increased O2 to 7L and waited for meds to take effect.     All fields with inspiratory and expiratory wheeze. By 0420, saturations and HR WNL on 3L though wheeze continues unchanged. Patient also appears to have increased bronchial congestion throughout the shift.

## 2021-06-12 NOTE — PROGRESS NOTES
MD Notification    Notified Person: MD    Notified Person Name: Dr. Morin    Notification Date/Time: 6/12/21 @ 1328     Notification Interaction: page sent    Purpose of Notification: patient wants and asking to go home.    Orders Received:    Comments: called back

## 2021-06-12 NOTE — PLAN OF CARE
A&Ox4.  Afebrile, tachycardic and tachypneic.  IMC, telemetry ST.  Weaned off BiPAP this morning and placed on 3L/NC, bumped to 7L/oximyzer after about half an hour.  LS dim with exp wheezing.  Frequent none productive cough.  ARCOS, kept on bed, purewick in use with good output.  Tolerated sips of clear and PO meds after 2 hours off BiPAP.  Regular diet started.  Flexeril and Tramadol for chronic shoulder pain.

## 2021-06-13 ENCOUNTER — DOCUMENTATION ONLY (OUTPATIENT)
Dept: HOME HEALTH SERVICES | Facility: CLINIC | Age: 71
End: 2021-06-13

## 2021-06-13 VITALS
SYSTOLIC BLOOD PRESSURE: 151 MMHG | OXYGEN SATURATION: 92 % | RESPIRATION RATE: 16 BRPM | HEART RATE: 90 BPM | DIASTOLIC BLOOD PRESSURE: 92 MMHG | TEMPERATURE: 97.9 F

## 2021-06-13 PROCEDURE — 250N000012 HC RX MED GY IP 250 OP 636 PS 637: Performed by: INTERNAL MEDICINE

## 2021-06-13 PROCEDURE — 99239 HOSP IP/OBS DSCHRG MGMT >30: CPT | Performed by: INTERNAL MEDICINE

## 2021-06-13 PROCEDURE — 250N000013 HC RX MED GY IP 250 OP 250 PS 637: Performed by: INTERNAL MEDICINE

## 2021-06-13 PROCEDURE — 250N000009 HC RX 250: Performed by: INTERNAL MEDICINE

## 2021-06-13 RX ORDER — BUDESONIDE AND FORMOTEROL FUMARATE DIHYDRATE 160; 4.5 UG/1; UG/1
2 AEROSOL RESPIRATORY (INHALATION) 2 TIMES DAILY
Qty: 10.2 G | Refills: 0 | Status: SHIPPED | OUTPATIENT
Start: 2021-06-13

## 2021-06-13 RX ORDER — AZITHROMYCIN 250 MG/1
250 TABLET, FILM COATED ORAL DAILY
Qty: 2 TABLET | Refills: 0 | Status: SHIPPED | OUTPATIENT
Start: 2021-06-14 | End: 2021-06-16

## 2021-06-13 RX ORDER — IPRATROPIUM BROMIDE AND ALBUTEROL SULFATE 2.5; .5 MG/3ML; MG/3ML
3 SOLUTION RESPIRATORY (INHALATION) 4 TIMES DAILY
Qty: 360 ML | Refills: 0 | Status: SHIPPED | OUTPATIENT
Start: 2021-06-13

## 2021-06-13 RX ORDER — GUAIFENESIN 600 MG/1
600 TABLET, EXTENDED RELEASE ORAL 2 TIMES DAILY
Qty: 60 TABLET | Refills: 0 | Status: SHIPPED | OUTPATIENT
Start: 2021-06-13

## 2021-06-13 RX ORDER — PREDNISONE 10 MG/1
TABLET ORAL
Qty: 45 TABLET | Refills: 0 | Status: SHIPPED | OUTPATIENT
Start: 2021-06-13

## 2021-06-13 RX ORDER — GUAIFENESIN/DEXTROMETHORPHAN 100-10MG/5
10 SYRUP ORAL EVERY 4 HOURS PRN
Qty: 118 ML | Refills: 0 | Status: SHIPPED | OUTPATIENT
Start: 2021-06-13

## 2021-06-13 RX ORDER — BUDESONIDE AND FORMOTEROL FUMARATE DIHYDRATE 160; 4.5 UG/1; UG/1
2 AEROSOL RESPIRATORY (INHALATION) 2 TIMES DAILY
Qty: 10.2 G | Refills: 0 | Status: SHIPPED | OUTPATIENT
Start: 2021-06-13 | End: 2021-06-13

## 2021-06-13 RX ADMIN — METOPROLOL SUCCINATE 25 MG: 25 TABLET, EXTENDED RELEASE ORAL at 08:03

## 2021-06-13 RX ADMIN — PREDNISONE 60 MG: 20 TABLET ORAL at 08:03

## 2021-06-13 RX ADMIN — IPRATROPIUM BROMIDE AND ALBUTEROL SULFATE 3 ML: .5; 3 SOLUTION RESPIRATORY (INHALATION) at 11:07

## 2021-06-13 RX ADMIN — PANTOPRAZOLE SODIUM 40 MG: 40 TABLET, DELAYED RELEASE ORAL at 08:04

## 2021-06-13 RX ADMIN — GUAIFENESIN 600 MG: 600 TABLET, EXTENDED RELEASE ORAL at 08:03

## 2021-06-13 RX ADMIN — IPRATROPIUM BROMIDE AND ALBUTEROL SULFATE 3 ML: .5; 3 SOLUTION RESPIRATORY (INHALATION) at 07:51

## 2021-06-13 RX ADMIN — AZITHROMYCIN MONOHYDRATE 250 MG: 250 TABLET ORAL at 08:06

## 2021-06-13 ASSESSMENT — ACTIVITIES OF DAILY LIVING (ADL)
ADLS_ACUITY_SCORE: 21

## 2021-06-13 NOTE — PROGRESS NOTES
I certify that this patient, Anneliese Oshea has been under my care (or a nurse practitioner or physican's assistant working with me). This is the face-to-face encounter for oxygen medical necessity.      Anneliese Oshea is now in a chronic stable state and continues to require supplemental oxygen. Patient has continued oxygen desaturation due to COPD J44.9.    Alternative treatment(s) tried or considered and deemed clinically infective for treatment of COPD J44.9 include nebulizers, steroids and pulmonary toileting.  If portability is ordered, is the patient mobile within the home? yes    **Patients who qualify for home O2 coverage under the CMS guidelines require ABG tests or O2 sat readings obtained closest to, but no earlier than 2 days prior to the discharge, as evidence of the need for home oxygen therapy. Testing must be performed while patient is in the chronic stable state. See notes for O2 sats.**

## 2021-06-13 NOTE — DISCHARGE SUMMARY
[facility]  Discharge Summary        Anneliese Oshea MRN# [id number]   YOB: 1950 Age: 71 year old     Date of Admission:  6/10/2021  Date of Discharge:  6/13/2021  Admitting Physician:  Servando Crain DO  Discharge Physician: Lilia Mroin MD  Discharging Service: Hospitalist     Primary Provider: Tami Hancock  Primary Care Physician Phone Number: [phone]         Discharge Diagnoses/Problem Oriented Hospital Course (Providers):    Anneliese Oshea was admitted on 6/10/2021 by Servando Crain DO and I would refer you to their history and physical.  The following problems were addressed during her hospitalization:  Assessment & Plan     Anneliese Oshea is a 71 year old female who was admitted on 6/10/2021.  IMPRESSION AND PLAN:  Ms. Oshea is a 71-year-old female with a past medical history significant for COPD, coronary artery disease and hypertension, who presents to the Emergency Department with increasing shortness of breath and found to have a COPD exacerbation.  1.  Chronic obstructive pulmonary disease exacerbation with acute hypoxic  hypercapnic respiratory failure:  The patient did have improvement in her pCO2 from 57 down to 46 and improvement in her pH from 7.27 to 7.35.  With initiation of BiPAP.  At this point, I will continue with the face mask oxygen and will have BiPAP available as needed.       We will continue with Solu-Medrol scheduled DuoNebs and p.r.n. albuterol nebs.  We will switch IV Solu-Medrol to prednisone 60 mg p.o. daily on 6/12/21   We will discharge her with prednisone 40 mg p.o. daily to come down by 10 mg every 4  days with a taper      There is no evidence of pneumonia on CT of the chest, which she does have a white count and a somewhat productive cough, so we will continue with azithromycin for the possibility of bronchitis given her underlying COPD.       We will discharge her with a Symbicort inhaler on dischargeRobitussin DM for cough PRN   Able to  come off of BiPAP on 6/11/2021   Patient is still needing oxygen 2 to 3 L by nasal cannula D satting to 82% on room air  Ordered O2 to 3 L of oxygen by nasal cannula to use at home  Patient is adamant on going home today so she is getting discharged in stable condition to home today      2.  Borderline aneurysmal ascending thoracic aorta:  Noted to be 4.0 cm on a CT of the chest.  We will ask the patient to follow up with PCP for surveillance.        3.  Hypertension:    We will continue with PTA regimen once confirmed, but appears to include metoprolol.  toprol xl restarted       4.  Dyslipidemia:  Appears to be on lovastatin, which can be continued upon discharge.  5.  Deep venous thrombosis prophylaxis:  SCDs.           Code Status: Full Code     Diposition: She is getting discharged to home with a nebulizer and 2 to 3 L of oxygen by nasal cannula in stable condition     Lilia Morin MD  [phone] (P)              Code Status:      Full Code        Brief Hospital Stay Summary Sent Home With Patient in AVS:        Reason for your hospital stay      COPD exacerbation and hypoxia                 Important Results:      See below        Pending Results:        Unresulted Labs Ordered in the Past 30 Days of this Admission     No orders found from 5/11/2021 to 6/11/2021.            Discharge Instructions and Follow-Up:      Follow-up Appointments     Follow-up and recommended labs and tests       Follow up with primary care provider, Tami Hancock, within 7 days for   hospital follow- up.  NO labs/tests are recommended:               Discharge Disposition:      Discharged to home        Discharge Medications:        Current Discharge Medication List      START taking these medications    Details   azithromycin (ZITHROMAX) 250 MG tablet Take 1 tablet (250 mg) by mouth daily for 2 days  Qty: 2 tablet, Refills: 0    Associated Diagnoses: COPD exacerbation (H)      budesonide-formoterol (SYMBICORT) 160-4.5 MCG/ACT  Inhaler Inhale 2 puffs into the lungs 2 times daily  Qty: 10.2 g, Refills: 0    Associated Diagnoses: COPD exacerbation (H)      guaiFENesin (MUCINEX) 600 MG 12 hr tablet Take 1 tablet (600 mg) by mouth 2 times daily  Qty: 60 tablet, Refills: 0    Associated Diagnoses: COPD exacerbation (H)      guaiFENesin-dextromethorphan (ROBITUSSIN DM) 100-10 MG/5ML syrup Take 10 mLs by mouth every 4 hours as needed for cough or congestion  Qty: 118 mL, Refills: 0    Associated Diagnoses: COPD exacerbation (H)      ipratropium - albuterol 0.5 mg/2.5 mg/3 mL (DUONEB) 0.5-2.5 (3) MG/3ML neb solution Take 1 vial (3 mLs) by nebulization 4 times daily  Qty: 360 mL, Refills: 0    Associated Diagnoses: COPD exacerbation (H)      predniSONE (DELTASONE) 10 MG tablet 4 tabs daily for 4 days come down by 10mg every 4days  Qty: 45 tablet, Refills: 0    Associated Diagnoses: COPD exacerbation (H)         CONTINUE these medications which have NOT CHANGED    Details   albuterol (PROAIR HFA/PROVENTIL HFA/VENTOLIN HFA) 108 (90 BASE) MCG/ACT Inhaler Inhale 2 puffs into the lungs 4 times daily      cyclobenzaprine (FLEXERIL) 5 MG tablet Take 5 mg by mouth 3 times daily as needed for muscle spasms      metoprolol (TOPROL-XL) 25 MG 24 hr tablet Take 1 tablet (25 mg) by mouth daily  Qty: 90 tablet, Refills: 0    Associated Diagnoses: Essential hypertension, benign      nicotine (NICODERM CQ) 14 MG/24HR 24 hr patch Place 1 patch onto the skin every 24 hours 2nd month  Qty: 30 patch, Refills: 0    Associated Diagnoses: Cigarette nicotine dependence with nicotine-induced disorder      traMADol (ULTRAM) 50 MG tablet Take 25 mg by mouth every 6 hours as needed for severe pain                Allergies:       No Known Allergies        Consultations This Hospital Stay:      Consultation requested from PT        Condition and Physical on Discharge:      Discharge condition: Stable   Vitals: Blood pressure (!) 151/92, pulse 90, temperature 97.9  F (36.6  C),  temperature source Oral, resp. rate 16, SpO2 92 %, not currently breastfeeding.     Constitutional:  Alert awake, not in acute distress   Lungs:  Good air entry bilaterally, upper airway wheezing heard on auscultation   Cardiovascular:  Normal rate, rhythm regular   Abdomen:  Soft, nontender, nondistended, no hepatosplenomegaly   Skin:  Warm and dry   Other:          Discharge Time:      Greater than 30 minutes.        Image Results From This Hospital Stay (For Non-EPIC Providers):        Results for orders placed or performed during the hospital encounter of 06/10/21   XR Chest Port 1 View    Narrative    EXAM: XR CHEST PORT 1 VIEW  LOCATION: [facility]  DATE/TIME: 6/10/2021 3:00 AM    INDICATION: Shortness of breath.  COMPARISON: None.      Impression    IMPRESSION: Cardiomediastinal silhouette within normal limits. Calcified granuloma left lung base. No vascular congestion or pleural effusion. Slight interstitial prominence upper lungs. Coronary artery stent. Atherosclerotic aorta.   CT Chest Pulmonary Embolism w Contrast    Narrative    EXAM: CT CHEST PULMONARY EMBOLISM W CONTRAST  LOCATION: [facility]  DATE/TIME: 6/10/2021 5:02 AM    INDICATION: Shortness of breath.  COMPARISON: None.  TECHNIQUE: CT chest pulmonary angiogram during arterial phase injection of IV contrast. Multiplanar reformats and MIP reconstructions were performed. Dose reduction techniques were used.   CONTRAST: 57 mL Isovue-370.    FINDINGS:  ANGIOGRAM CHEST: Pulmonary arteries are normal caliber and negative for pulmonary emboli. The ascending thoracic aorta is borderline aneurysmal at 4.0 cm. The aorta is poorly opacified limiting evaluation for dissection. The heart size is normal.    LUNGS AND PLEURA: Calcified granuloma at the left lung base. Mild peripheral fibrotic changes. Mild emphysematous disease in the lung apices. No pneumothorax or pleural effusion.    MEDIASTINUM/AXILLAE: Calcified lymph nodes in  the left hilum. No lymph node enlargement.    CORONARY ARTERY CALCIFICATION: Severe.    UPPER ABDOMEN: Calcified granulomas in the spleen.    MUSCULOSKELETAL: Mild degenerative disease in the spine.      Impression    IMPRESSION:  1.  There is no pulmonary embolus.  2.  Borderline aneurysmal ascending thoracic aorta measuring 4.0 cm. Evaluation for aortic dissection is limited by suboptimal opacification of the aorta.  3.  Coronary artery atherosclerotic calcifications.               XR Chest Port 1 View    Narrative    CHEST ONE VIEW  6/10/2021 3:55 PM     HISTORY: Worsening respiratory distress, slightly decreased right  breath sounds.    COMPARISON: 6/10/2020      Impression    IMPRESSION: No acute infiltrates. Stable granulomatous change.    KARLIE VELIZ MD             Most Recent Lab Results In EPIC (For Non-EPIC Providers):    Most Recent 3 CBC's:  Recent Labs   Lab Test 06/11/21  0647 06/10/21  0252   WBC 12.5* 12.5*   HGB 13.3 14.4   MCV 97 99    298      Most Recent 3 BMP's:  Recent Labs   Lab Test 06/11/21  0647 06/10/21  0252    136   POTASSIUM 3.7 3.7   CHLORIDE 104 105   CO2 26 24   BUN 14 18   CR 0.66 0.86   ANIONGAP 5 7   ENDY 9.4 9.1   * 169*     Most Recent 3 Troponin's:  Recent Labs   Lab Test 06/10/21  0252   TROPI <0.015     Most Recent 3 INR's:  Recent Labs   Lab Test 06/10/21  0252   INR 0.91     Most Recent 2 LFT's:  Recent Labs   Lab Test 06/10/21  0252   AST 12   ALT 19   ALKPHOS 92   BILITOTAL 0.4     Most Recent Cholesterol Panel:  Recent Labs   Lab Test 09/02/16   CHOL 233*   *   HDL 52   TRIG 172*     Most Recent 6 Bacteria Isolates From Any Culture (See EPIC Reports for Culture Details):No lab results found.  Most Recent TSH, T4 and HgbA1c: No lab results found.

## 2021-06-13 NOTE — CONSULTS
Care Management:    Patient needs home oxygen and nebulizer.  Contacted [facility].  They contacted patient and will deliver oxygen and nebulizer to patient hospital room prior to discharge.  Met briefly with patient and explained this.  She stated understanding.  Her friend will transport her home.    Portia Moss RN

## 2021-06-13 NOTE — PLAN OF CARE
A&Ox4.  AVSS, weaned to RA this afternoon.  Still get SOB with activity, recovers after couple minutes with some coaching.  Doing pulmonary hygiene independently.  Tolerating diet.  Voiding w/o difficulty.  Up SBA towards end of shift.  Plan for early discharge tomorrow per Dr. Morin.

## 2021-06-13 NOTE — PLAN OF CARE
Review discharge instruction with patient. Questions answered. Patient discharged home with friend. Discharge instructions, medications, and belongings at this 1337.

## 2021-06-13 NOTE — PROGRESS NOTES
Oxygen Provider:  Arranged through [facility], contact number [phone].  If you have any questions or concerns please call the oxygen company directly.    Received intake call for home oxygen at 11:50 AM. Reviewed patient's chart; Patient qualifies under insurance guidelines and all documentation is in the chart including a good order.   11:50 AM- Called to offer choice and patient is okay with [facility] setting them up. Discussed equipment with patient and informed them that we would be to bedside with oxygen in the next 2 hours.   11:55 AM- Spoke with care coordinator, Portia, confirmed we received the order, and provided them with ETA of oxygen.

## 2021-06-13 NOTE — PROGRESS NOTES
Patient has been assessed for Home Oxygen needs. Oxygen readings:    *Pulse oximetry (SpO2) = 85% on room air at rest while awake.    *SpO2 improved to 92% on 2liters/minute at rest.    *SpO2 = 82% on room air during activity/with exercise.    *SpO2 improved to 92% on 2liters/minute during activity/with exercise.

## 2021-06-13 NOTE — PLAN OF CARE
Patient is alert and oriented x 4. Doing pulmonary hygiene independently. Denies SOB in bed. Voiding well. For possible discharge today.

## 2021-06-14 NOTE — PLAN OF CARE
Physical Therapy Discharge Summary    Reason for therapy discharge:    Discharged to home.    Progress towards therapy goal(s). See goals on Care Plan in [facility] electronic health record for goal details.  Goals partially met.  Barriers to achieving goals:   discharge from facility.    Therapy recommendation(s):    No further therapy is recommended.